# Patient Record
Sex: FEMALE | Employment: FULL TIME | URBAN - METROPOLITAN AREA
[De-identification: names, ages, dates, MRNs, and addresses within clinical notes are randomized per-mention and may not be internally consistent; named-entity substitution may affect disease eponyms.]

---

## 2020-12-19 ENCOUNTER — HOSPITAL ENCOUNTER (EMERGENCY)
Facility: HOSPITAL | Age: 60
Discharge: STILL ADMITTED OR ADMINISTRATIVE | End: 2020-12-19

## 2022-09-23 PROBLEM — I10 ESSENTIAL HYPERTENSION: Status: ACTIVE | Noted: 2022-09-23

## 2022-09-23 PROBLEM — M25.473 ANKLE EDEMA: Status: ACTIVE | Noted: 2022-09-23

## 2022-09-23 PROBLEM — R06.02 SHORTNESS OF BREATH: Status: ACTIVE | Noted: 2022-09-23

## 2022-09-23 PROBLEM — M54.9 CHRONIC BACK PAIN: Status: ACTIVE | Noted: 2022-09-23

## 2022-09-23 PROBLEM — N39.3 URINARY, INCONTINENCE, STRESS FEMALE: Status: ACTIVE | Noted: 2022-09-23

## 2022-09-23 PROBLEM — E66.813 CLASS 3 SEVERE OBESITY WITH BODY MASS INDEX (BMI) OF 50.0 TO 59.9 IN ADULT: Status: ACTIVE | Noted: 2022-09-23

## 2022-09-23 PROBLEM — M25.569 CHRONIC KNEE PAIN: Status: ACTIVE | Noted: 2022-09-23

## 2022-09-23 PROBLEM — R06.83 SNORING: Status: ACTIVE | Noted: 2022-09-23

## 2022-09-23 PROBLEM — R53.1 WEAKNESS: Status: ACTIVE | Noted: 2022-09-23

## 2022-09-23 PROBLEM — E66.01 CLASS 3 SEVERE OBESITY WITH BODY MASS INDEX (BMI) OF 50.0 TO 59.9 IN ADULT: Status: ACTIVE | Noted: 2022-09-23

## 2022-09-23 PROBLEM — G89.29 CHRONIC BACK PAIN: Status: ACTIVE | Noted: 2022-09-23

## 2022-09-23 PROBLEM — G89.29 CHRONIC KNEE PAIN: Status: ACTIVE | Noted: 2022-09-23

## 2022-09-23 PROBLEM — I73.9 INTERMITTENT CLAUDICATION: Status: ACTIVE | Noted: 2022-09-23

## 2022-09-26 ENCOUNTER — CONSULT (OUTPATIENT)
Dept: BARIATRICS/WEIGHT MGMT | Facility: CLINIC | Age: 62
End: 2022-09-26

## 2022-09-26 VITALS
SYSTOLIC BLOOD PRESSURE: 156 MMHG | HEART RATE: 84 BPM | HEIGHT: 63 IN | DIASTOLIC BLOOD PRESSURE: 91 MMHG | RESPIRATION RATE: 18 BRPM | TEMPERATURE: 97.8 F | WEIGHT: 293 LBS | BODY MASS INDEX: 51.91 KG/M2

## 2022-09-26 DIAGNOSIS — E66.01 CLASS 3 SEVERE OBESITY DUE TO EXCESS CALORIES WITH SERIOUS COMORBIDITY AND BODY MASS INDEX (BMI) OF 50.0 TO 59.9 IN ADULT: Primary | ICD-10-CM

## 2022-09-26 DIAGNOSIS — R06.83 SNORING: ICD-10-CM

## 2022-09-26 DIAGNOSIS — R06.02 SHORTNESS OF BREATH: ICD-10-CM

## 2022-09-26 DIAGNOSIS — N39.3 URINARY, INCONTINENCE, STRESS FEMALE: ICD-10-CM

## 2022-09-26 DIAGNOSIS — I10 ESSENTIAL HYPERTENSION: ICD-10-CM

## 2022-09-26 DIAGNOSIS — G89.29 CHRONIC BACK PAIN, UNSPECIFIED BACK LOCATION, UNSPECIFIED BACK PAIN LATERALITY: ICD-10-CM

## 2022-09-26 DIAGNOSIS — M54.9 CHRONIC BACK PAIN, UNSPECIFIED BACK LOCATION, UNSPECIFIED BACK PAIN LATERALITY: ICD-10-CM

## 2022-09-26 DIAGNOSIS — K21.9 GASTROESOPHAGEAL REFLUX DISEASE, UNSPECIFIED WHETHER ESOPHAGITIS PRESENT: ICD-10-CM

## 2022-09-26 DIAGNOSIS — G89.29 CHRONIC KNEE PAIN, UNSPECIFIED LATERALITY: ICD-10-CM

## 2022-09-26 DIAGNOSIS — M25.569 CHRONIC KNEE PAIN, UNSPECIFIED LATERALITY: ICD-10-CM

## 2022-09-26 DIAGNOSIS — M25.473 ANKLE EDEMA: ICD-10-CM

## 2022-09-26 DIAGNOSIS — R53.1 WEAKNESS: ICD-10-CM

## 2022-09-26 PROBLEM — Z01.818 PREOPERATIVE TESTING: Status: ACTIVE | Noted: 2022-09-26

## 2022-09-26 PROBLEM — Z01.818 PREOPERATIVE TESTING: Status: RESOLVED | Noted: 2022-09-26 | Resolved: 2022-09-26

## 2022-09-26 PROCEDURE — 99205 OFFICE O/P NEW HI 60 MIN: CPT | Performed by: NURSE PRACTITIONER

## 2022-09-26 RX ORDER — OMEPRAZOLE 40 MG/1
1 CAPSULE, DELAYED RELEASE ORAL DAILY
COMMUNITY
Start: 2022-09-12

## 2022-09-26 RX ORDER — ATENOLOL 50 MG/1
1 TABLET ORAL DAILY
COMMUNITY
Start: 2022-08-14

## 2022-09-26 RX ORDER — MELOXICAM 15 MG/1
1 TABLET ORAL DAILY
COMMUNITY
Start: 2022-08-24

## 2022-09-26 RX ORDER — ASPIRIN 81 MG/1
81 TABLET, CHEWABLE ORAL DAILY
COMMUNITY

## 2022-09-26 RX ORDER — SPIRONOLACTONE 50 MG/1
1 TABLET, FILM COATED ORAL DAILY
COMMUNITY
Start: 2022-08-23

## 2022-09-26 RX ORDER — DIPHENHYDRAMINE HCL 25 MG
25 CAPSULE ORAL 2 TIMES DAILY
COMMUNITY

## 2022-09-26 RX ORDER — SIMVASTATIN 40 MG
1 TABLET ORAL DAILY
COMMUNITY
Start: 2022-08-24

## 2022-09-26 RX ORDER — HYDROCHLOROTHIAZIDE 50 MG/1
1 TABLET ORAL DAILY
COMMUNITY
Start: 2022-08-23

## 2022-09-26 RX ORDER — POTASSIUM CHLORIDE 750 MG/1
10 TABLET, FILM COATED, EXTENDED RELEASE ORAL 2 TIMES DAILY
COMMUNITY

## 2022-09-26 NOTE — PROGRESS NOTES
"Metabolic and Bariatric Surgery Nutrition Assessment    Patient Name: Mishel Jimenez    YOB: 1960   Age: 61 y.o.  Sex: female  MRN: [id number]     Assessment Date: 9/26/22    Procedure considering: band     Anthropometric Measurements  Height: 62.99\"          Current weight: 311 lbs   BMI: 55.11 kg/m²    Patient Goals and Expectations                        Patient's stated weight goal: 190-200 lbs  Reasons for desiring weight loss: improved quality of life, less pain and increased energy     Medical History  Pertinent diagnosis/problems: hypertension, edema, back pain, knee pain and shortness of breath    Weight History  Highest adult weight: 311 lbs                              Lowest adult weight: 147 lbs  Onset of obesity: childhood   Possible triggers or life events that may have led to weight gain: always been overweight, loves food     Previous Weight Loss Efforts  Patient has tried to lose weight in the past including Weight Watchers, calorie counting, low carbohydrate, low fat diet.   Patient has lost up to 60 lbs on diets, but was unable to maintain this long term.     Diet History  Patient is eating 2-3 meals and 1-2 snacks daily. 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   Current Intake  Breakfast: breakfast sandwich, bakery item or nothing   Lunch: [employer] sandwich, fries and dessert or ham sandwich   Dinner: [employer] sandwich fries and dessert or meat, vegetable and starch  Snacks: cookies, ice cream   Beverages: sweet tea, iced coffee   Eating out: daily   Vitamins/supplements: none   Diet restrictions or food allergies: none     Patient identifies problem areas to be physical hunger, over consumption, eating large portions, eating out of boredom, emotional eating, enjoyment of food and inactivity.      Physical Activity  Work-related activity: sedentary, a lot of traveling   Planned exercise: none   Barriers to activity: knee pain     Nutrition Intervention  Pre " and post op nutrition education and coaching for behavior change completed. Program materials provided. Emphasized the importance of taking in at least 70 g protein and 64 oz fluid daily. Discussed personal habits and lifestyle behaviors that may influence weight loss efforts. Self monitoring strategies such as keeping a food journal were encouraged. Patient verbalized understanding and questions were answered.  Short term goals: practice mindful eating, decrease/eliminate high calorie beverages     Recommendations/Plan  Patient will be evaluated by multidisciplinary team before undergoing a review of their candidacy.  Additional nutrition counseling available and encouraged both pre and post op.   Patient demonstrated a good comprehension of diet requirements and commitment to make healthy changes.   Mishel Jimenez appears to be a suitable candidate for bariatric surgery from a nutritional standpoint.      Bonnie Waters RD  09/26/22  15:01 EDT

## 2022-09-26 NOTE — PROGRESS NOTES
[facility] BARIATRIC SURGERY  [address] [facility] [address]  [facility] [address]  [phone]  [address] [id number] [phone]  Dept: [phone]  9/26/2022      Mishel Jimenez.  [id number]  [id number]  1960  female      Chief Complaint of weight gain; unable to maintain weight loss    History of Present Illness:   Mishel is a 61 y.o. female who presents today for evaluation, education and consultation regarding weight loss surgery. The patient is interested in the lap band.      Diet History:Mishel has been overweight for at least 40 years, has been 35 pounds or more overweight for at least 35 years, has been 100 pounds or more overweight for 35 or more years and started dieting at age 32.  The most weight Mishel lost was 16 pounds on 30 and maintained the weight loss for 12 months. Mishel describes her eating habits as snacking without portion control, overeating at mealtimes, drinking coffee and soda. Mishel Jimenez has tried Atkins, [place], Weight Watchers, Physician monitored, Dietician monitored, reduced calorie, prescription medications and previous weight loss surgery among others with success of losing up to 30 pounds, but in each instance regained the weight.     See dietician documentation for complete history.    Bariatric Surgery Evaluation: The patient is being seen for an initial visit for bariatric surgery evaluation and education.     Bariatric Co-morbidities:  hypertension, back pain, knee pain and edema    Patient Active Problem List   Diagnosis   • Class 3 severe obesity with body mass index (BMI) of 50.0 to 59.9 in adult (HCC)   • Essential hypertension   • Ankle edema   • Intermittent claudication (HCC)   • Shortness of breath   • Snoring   • Urinary, incontinence, stress female   • Chronic back pain   • Chronic knee pain   • Weakness   • GERD (gastroesophageal reflux disease)       No past medical history on  file.    Past Surgical History:   Procedure Laterality Date   • HIATAL HERNIA REPAIR  2015   • LAPAROSCOPIC CHOLECYSTECTOMY  2008       Allergies   Allergen Reactions   • Demerol [Meperidine] Nausea And Vomiting         Current Outpatient Medications:   •  atenolol (TENORMIN) 50 MG tablet, Take 1 tablet by mouth Daily., Disp: , Rfl:   •  hydroCHLOROthiazide (HYDRODIURIL) 50 MG tablet, Take 1 tablet by mouth Daily., Disp: , Rfl:   •  meloxicam (MOBIC) 15 MG tablet, Take 1 tablet by mouth Daily., Disp: , Rfl:   •  omeprazole (priLOSEC) 40 MG capsule, Take 1 capsule by mouth Daily., Disp: , Rfl:   •  simvastatin (ZOCOR) 40 MG tablet, Take 1 tablet by mouth Daily., Disp: , Rfl:   •  spironolactone (ALDACTONE) 50 MG tablet, Take 1 tablet by mouth Daily., Disp: , Rfl:     Social History     Socioeconomic History   • Marital status:    Tobacco Use   • Smoking status: Former Smoker     Types: Cigarettes     Quit date: 2019     Years since quitting: 3.7   • Smokeless tobacco: Never Used   Vaping Use   • Vaping Use: Never used   Substance and Sexual Activity   • Alcohol use: Not Currently     Comment: 1 drink per month   • Drug use: Never   • Sexual activity: Defer       Family History   Problem Relation Age of Onset   • Hypertension Father    • Sleep apnea Father    • Obesity Sister    • Heart attack Maternal Grandmother    • Hypertension Paternal Grandmother    • Obesity Paternal Grandmother    • Parkinsonism Paternal Grandfather          Review of Systems:  Review of Systems   Constitutional: Positive for appetite change. Negative for fatigue and unexpected weight change.   HENT: Negative.    Eyes: Negative.    Respiratory: Negative.    Cardiovascular: Positive for leg swelling.   Gastrointestinal: Negative for abdominal distention, abdominal pain, constipation, diarrhea, nausea and vomiting.   Genitourinary: Negative for difficulty urinating, frequency and urgency.   Musculoskeletal: Positive for arthralgias and  back pain.   Skin: Negative.    Psychiatric/Behavioral: Negative.    All other systems reviewed and are negative.      Physical Exam:  Vital Signs:  Weight: (!) 141 kg (311 lb)   Body mass index is 55.11 kg/m².  Temp: 97.8 °F (36.6 °C)   Heart Rate: 84   BP: 156/91     Physical Exam  Vitals and nursing note reviewed.   Constitutional:       Appearance: She is well-developed. She is obese.   Neck:      Thyroid: No thyromegaly.   Cardiovascular:      Rate and Rhythm: Tachycardia present.   Pulmonary:      Effort: Pulmonary effort is normal. No respiratory distress.   Abdominal:      Palpations: Abdomen is soft.   Musculoskeletal:         General: No tenderness.   Skin:     General: Skin is warm and dry.   Neurological:      Mental Status: She is alert.   Psychiatric:         Behavior: Behavior normal.            Assessment:         Mishel Jimenez is a 61 y.o. year old female with medically complicated severe obesity. Weight: (!) 141 kg (311 lb), Body mass index is 55.11 kg/m². and weight related problems including hypertension, back pain, knee pain and edema.    I explained in detail, potential surgical options of interest to the patient including the RNY gastric bypass, sleeve gastrectomy, and gastric band while considering the patient's medical history. At this time, the patient expressed interest in the Laparoscopic Sleeve  All of those procedures can be performed laparoscopically but there is a chance to convert to open if any technical challenges or complications do occur.  Bariatric surgery is not cosmetic surgery but rather a tool to help a patient make a life-long commitment lifestyle changes including diet, exercise, behavior changes, and taking supplemental vitamins and minerals.    Due to the patient's BMI, history, and co-morbidities related to potential surgical complications were evaluated. Due to Mishel Jimenez's risk factors female will obtain the following prior to being scheduled for surgical  intervention:    A letter of medical support as well as a history and physical must be obtained from her primary care provider. The patient was given a copy of a sample form, that will suffice as their letter to take to their primary are provider.    A referral for pre-operative psychological evaluation was ordered for the patient to evaluate candidacy as well as provide mental health support, should it be warranted before or after surgery.    Patient will have PCP send copies of lab work and  CXR, EGD with biopsy and psychology clearance and UGI were ordered at this time. These will be drawn and patient will be notified with results. Patient will complete new, pre-operative radiology prior to being scheduled for surgery. Mishel Jimenez will obtain clearance from a cardiologist prior to surgery.     Mishel Jimenez was screened for sleep apnea in our office today and based on their results she is low risk for ENMANUEL. The risks, as they relate to chronic hypercapnia r/t untreated ENMANUEL were discussed with the patient and She verbalized understanding.       The risks, benefits, alternatives, and potential complications of all of the sleeve gastrectomy explained in detail including, but not limited to death, anesthesia and medication adverse effect/DVT, pulmonary embolism, trocar site/incisional hernia, wound infection, abdominal infection, bleeding, failure to lose weight or gain weight and change in body image, metabolic complications with calcium, thiamine, vitamin B12, folate, iron, and anemia.    As this patient is a female who is post-menopausal or has undergone a surgical operation which precluded her from becoming pregnant she was not counseled related to conception and pregnancy.     The patient was advised to start a high protein, low fat and low carbohydrate diet  start routine exercise including but not limited to 150 minutes per week. The patient received a resistance band along with a handout of exercises. The  patient was given individualized information by our dietician along with handouts.     The patient was given information regarding the MELVIN educational video. MELVIN is an internet based educational video which explains the sourgical procedure and answers basic questions regarding the procedure. The patient was provided with instructions and a password to watch the video.    The patient understands the surgical procedures and the different surgical options that are available.  She understands the lifestyle changes that would be required after surgery and has agreed to participate in a pre-operative and postoperative weight management program.  She also expressed understanding of possible risks, had several questions answered and desires to proceed.    I think she is a good candidate for this surgery, and is interested in a sleeve gastrectomy.    Encounter Diagnoses   Name Primary?   • Class 3 severe obesity due to excess calories with serious comorbidity and body mass index (BMI) of 50.0 to 59.9 in adult (HCC) Yes   • Essential hypertension    • Chronic knee pain, unspecified laterality    • Ankle edema    • Shortness of breath    • Weakness    • Urinary, incontinence, stress female    • Chronic back pain, unspecified back location, unspecified back pain laterality    • Gastroesophageal reflux disease, unspecified whether esophagitis present        Plan:    Patient will be evaluated by a bariatric dietician psychologist before undergoing a multidisciplinary review of her candidacy. We discussed weight loss requirements prior to surgery and rationale, as well as other program requirements to ensure the safest approach to surgery. We spent time discussing different surgical procedures and plan of care throughout their lifespan to ensure long term success in achieving and maintaining a healthier weight. Patient will proceed with preoperative lab work, radiology, and endoscopy after obtaining agreed upon specialty,  clearances, sleep study, and letter of support from her primary care provider.    Total time spent during this encounter today was 65 minutes and includes preparing for the visit, reviewing tests, performing a medically appropriate examination and/or evaluations, counseling and educating the patient/family/caregiver, ordering medications, tests, or procedures, documenting information in the medical record and independently interpreting results and communicating that information with the patient/family/caregiver  TIFFANY Mesa  9/26/2022

## 2022-11-14 ENCOUNTER — HOSPITAL ENCOUNTER (OUTPATIENT)
Dept: CARDIOLOGY | Facility: HOSPITAL | Age: 62
Discharge: HOME | End: 2022-11-14

## 2022-11-14 ENCOUNTER — HOSPITAL ENCOUNTER (OUTPATIENT)
Dept: GENERAL RADIOLOGY | Facility: HOSPITAL | Age: 62
Discharge: HOME | End: 2022-11-14

## 2022-11-14 DIAGNOSIS — M25.569 CHRONIC KNEE PAIN, UNSPECIFIED LATERALITY: ICD-10-CM

## 2022-11-14 DIAGNOSIS — E66.01 CLASS 3 SEVERE OBESITY DUE TO EXCESS CALORIES WITH SERIOUS COMORBIDITY AND BODY MASS INDEX (BMI) OF 50.0 TO 59.9 IN ADULT: ICD-10-CM

## 2022-11-14 DIAGNOSIS — I10 ESSENTIAL HYPERTENSION: ICD-10-CM

## 2022-11-14 DIAGNOSIS — M25.473 ANKLE EDEMA: ICD-10-CM

## 2022-11-14 DIAGNOSIS — N39.3 URINARY, INCONTINENCE, STRESS FEMALE: ICD-10-CM

## 2022-11-14 DIAGNOSIS — G89.29 CHRONIC KNEE PAIN, UNSPECIFIED LATERALITY: ICD-10-CM

## 2022-11-14 DIAGNOSIS — G89.29 CHRONIC BACK PAIN, UNSPECIFIED BACK LOCATION, UNSPECIFIED BACK PAIN LATERALITY: ICD-10-CM

## 2022-11-14 DIAGNOSIS — R53.1 WEAKNESS: ICD-10-CM

## 2022-11-14 DIAGNOSIS — R06.83 SNORING: ICD-10-CM

## 2022-11-14 DIAGNOSIS — R06.02 SHORTNESS OF BREATH: ICD-10-CM

## 2022-11-14 DIAGNOSIS — K21.9 GASTROESOPHAGEAL REFLUX DISEASE, UNSPECIFIED WHETHER ESOPHAGITIS PRESENT: ICD-10-CM

## 2022-11-14 DIAGNOSIS — M54.9 CHRONIC BACK PAIN, UNSPECIFIED BACK LOCATION, UNSPECIFIED BACK PAIN LATERALITY: ICD-10-CM

## 2022-11-14 LAB — QT INTERVAL: 432 MS

## 2022-11-14 PROCEDURE — 74246 X-RAY XM UPR GI TRC 2CNTRST: CPT

## 2022-11-14 PROCEDURE — 93010 ELECTROCARDIOGRAM REPORT: CPT | Performed by: INTERNAL MEDICINE

## 2022-11-14 PROCEDURE — 71046 X-RAY EXAM CHEST 2 VIEWS: CPT

## 2022-11-14 PROCEDURE — 93005 ELECTROCARDIOGRAM TRACING: CPT | Performed by: NURSE PRACTITIONER

## 2022-11-14 RX ADMIN — ANTACID/ANTIFLATULENT 1 PACKET: 380; 550; 10; 10 GRANULE, EFFERVESCENT ORAL at 11:14

## 2022-11-14 RX ADMIN — BARIUM SULFATE 135 ML: 980 POWDER, FOR SUSPENSION ORAL at 11:14

## 2022-11-14 RX ADMIN — BARIUM SULFATE 355 ML: 0.6 SUSPENSION ORAL at 11:14

## 2022-11-21 DIAGNOSIS — I49.1 ECTOPIC ATRIAL RHYTHM: Primary | ICD-10-CM

## 2022-12-27 ENCOUNTER — OFFICE VISIT (OUTPATIENT)
Dept: CARDIOLOGY | Facility: CLINIC | Age: 62
End: 2022-12-27

## 2022-12-27 VITALS
HEIGHT: 63 IN | WEIGHT: 293 LBS | HEART RATE: 69 BPM | DIASTOLIC BLOOD PRESSURE: 68 MMHG | BODY MASS INDEX: 51.91 KG/M2 | SYSTOLIC BLOOD PRESSURE: 132 MMHG

## 2022-12-27 DIAGNOSIS — E78.5 HYPERLIPIDEMIA, UNSPECIFIED HYPERLIPIDEMIA TYPE: ICD-10-CM

## 2022-12-27 DIAGNOSIS — I49.1 PAC (PREMATURE ATRIAL CONTRACTION): ICD-10-CM

## 2022-12-27 DIAGNOSIS — I10 ESSENTIAL HYPERTENSION: Primary | ICD-10-CM

## 2022-12-27 PROCEDURE — 99204 OFFICE O/P NEW MOD 45 MIN: CPT | Performed by: INTERNAL MEDICINE

## 2022-12-27 PROCEDURE — 93000 ELECTROCARDIOGRAM COMPLETE: CPT | Performed by: INTERNAL MEDICINE

## 2022-12-27 NOTE — PROGRESS NOTES
Subjective:     Encounter Date:12/27/2022      Patient ID: Mishel Jimenez is a 62 y.o. female.    Chief Complaint:  History of Present Illness    This is a 62-year-old with hypertension, hyperlipidemia, morbid obesity, who presents for preoperative evaluation prior to bariatric surgery and for evaluation of PACs.    Patient's was undergoing routine evaluation prior to bariatric surgery.  An EKG was performed in 11/2022 which showed sinus rhythm with frequent premature atrial contractions.  As a result she was sent here for further cardiac evaluation.    The patient denies any chest pain, palpitations, orthopnea, near-syncope or syncope, or lower extremity edema.  She reports stable dyspnea with exertion.  She does not exercise but does a lot of walking as part of her job and denies any significant issues with this.  She does not routinely climb stairs but indicates she is able to climb a flight of stairs without stopping.  She reports that they thing that limits her the most are her knees.    She denies any personal cardiac history.  She denies any prior cardiac testing.  She denies any family history of heart disease in her immediate family.  She does have a grandfather who had a myocardial infarction in his late 70s.    Review of Systems   Constitutional: Positive for malaise/fatigue.   HENT: Negative for hearing loss, hoarse voice, nosebleeds and sore throat.    Eyes: Negative for pain.   Cardiovascular: Positive for leg swelling. Negative for chest pain, claudication, cyanosis, dyspnea on exertion, irregular heartbeat, near-syncope, orthopnea, palpitations, paroxysmal nocturnal dyspnea and syncope.   Respiratory: Negative for shortness of breath and snoring.    Endocrine: Negative for cold intolerance, heat intolerance, polydipsia, polyphagia and polyuria.   Skin: Negative for itching and rash.   Musculoskeletal: Positive for joint pain. Negative for arthritis, falls, joint swelling, muscle cramps, muscle  weakness and myalgias.   Gastrointestinal: Negative for constipation, diarrhea, dysphagia, heartburn, hematemesis, hematochezia, melena, nausea and vomiting.   Genitourinary: Negative for frequency, hematuria and hesitancy.   Neurological: Negative for excessive daytime sleepiness, dizziness, headaches, light-headedness, numbness and weakness.   Psychiatric/Behavioral: Negative for depression. The patient is not nervous/anxious.          Current Outpatient Medications:   •  aspirin 81 MG chewable tablet, Chew 81 mg Daily., Disp: , Rfl:   •  atenolol (TENORMIN) 50 MG tablet, Take 1 tablet by mouth Daily., Disp: , Rfl:   •  diphenhydrAMINE (BENADRYL) 25 mg capsule, Take 25 mg by mouth 2 (Two) Times a Day., Disp: , Rfl:   •  hydroCHLOROthiazide (HYDRODIURIL) 50 MG tablet, Take 1 tablet by mouth Daily., Disp: , Rfl:   •  meloxicam (MOBIC) 15 MG tablet, Take 1 tablet by mouth Daily., Disp: , Rfl:   •  omeprazole (priLOSEC) 40 MG capsule, Take 1 capsule by mouth Daily., Disp: , Rfl:   •  potassium chloride 10 MEQ CR tablet, Take 10 mEq by mouth 2 (Two) Times a Day., Disp: , Rfl:   •  simvastatin (ZOCOR) 40 MG tablet, Take 1 tablet by mouth Daily., Disp: , Rfl:   •  spironolactone (ALDACTONE) 50 MG tablet, Take 1 tablet by mouth Daily., Disp: , Rfl:     Past Medical History:   Diagnosis Date   • Hyperlipidemia    • Hypertension        Past Surgical History:   Procedure Laterality Date   •  SECTION     • HERNIA REPAIR Right 2014    ventral   • HIATAL HERNIA REPAIR  2015   • LAPAROSCOPIC CHOLECYSTECTOMY  2008       Family History   Problem Relation Age of Onset   • Hypertension Father    • Sleep apnea Father    • Obesity Sister    • Heart attack Maternal Grandmother    • Hypertension Paternal Grandmother    • Obesity Paternal Grandmother    • Parkinsonism Paternal Grandfather    • Heart attack Maternal Grandfather        Social History     Tobacco Use   • Smoking status: Former     Packs/day: 1.00      "Years: 30.00     Pack years: 30.00     Types: Cigarettes     Start date: 1980     Quit date: 2020     Years since quittin.8   • Smokeless tobacco: Never   • Tobacco comments:     Stopped when pregnant for approx 5 years and then again for an additinal 5.5 years   Vaping Use   • Vaping Use: Never used   Substance Use Topics   • Alcohol use: Yes     Comment: Occasionally. 2-3 drinks 2-3 times a year   • Drug use: Never         ECG 12 Lead    Date/Time: 2022 2:56 PM  Performed by: Elvira Pedersen MD  Authorized by: Elvira Pedersen MD   Comparison: compared with previous ECG   Similar to previous ECG  Rhythm: sinus rhythm  Ectopy: atrial premature contractions               Objective:     Visit Vitals  /68   Pulse 69   Ht 160 cm (63\")   Wt (!) 141 kg (311 lb)   BMI 55.09 kg/m²         Constitutional:       Appearance: Normal appearance. Well-developed.   Eyes:      General: Lids are normal.      Conjunctiva/sclera: Conjunctivae normal.      Pupils: Pupils are equal, round, and reactive to light.   HENT:      Head: Normocephalic and atraumatic.   Neck:      Vascular: No carotid bruit or JVD.      Lymphadenopathy: No cervical adenopathy.   Pulmonary:      Effort: Pulmonary effort is normal.      Breath sounds: Normal breath sounds.   Cardiovascular:      Normal rate. Regular rhythm.      No gallop.   Pulses:     Radial: 2+ bilaterally.  Edema:     Peripheral edema absent.   Abdominal:      Palpations: Abdomen is soft.   Musculoskeletal:      Cervical back: Full passive range of motion without pain, normal range of motion and neck supple. Skin:     General: Skin is warm and dry.   Neurological:      Mental Status: Alert and oriented to person, place, and time.             Assessment:          Diagnosis Plan   1. Essential hypertension        2. PAC (premature atrial contraction)  Adult Transthoracic Echo Complete w/ Color, Spectral and Contrast if Necessary Per Protocol      3. Hyperlipidemia, " unspecified hyperlipidemia type               Plan:         1.  PACs.  Asymptomatic.  Reassured the patient that these are benign but anything could be reasonable to proceed with an echocardiogram to rule out structural heart disease.  2.  Hypertension.  Well-controlled on current regimen medications.  Continue the same.  3.  Hyperlipidemia.  On simvastatin.  4.  Morbid obesity.  Undergoing evaluation for bariatric surgery.    We will follow-up on echocardiogram results.  If this looks okay I think the patient can proceed with surgery as planned.  Further recommendations will be based on the results of her echocardiogram.

## 2022-12-30 ENCOUNTER — PATIENT ROUNDING (BHMG ONLY) (OUTPATIENT)
Dept: CARDIOLOGY | Facility: CLINIC | Age: 62
End: 2022-12-30

## 2022-12-30 NOTE — PROGRESS NOTES
A My Chart message has been sent to the patient for [place] [facility] [place]

## 2023-01-19 ENCOUNTER — HOSPITAL ENCOUNTER (OUTPATIENT)
Dept: CARDIOLOGY | Facility: HOSPITAL | Age: 63
Discharge: HOME | End: 2023-01-19
Admitting: INTERNAL MEDICINE
Payer: COMMERCIAL

## 2023-01-19 VITALS
HEART RATE: 74 BPM | DIASTOLIC BLOOD PRESSURE: 82 MMHG | WEIGHT: 293 LBS | SYSTOLIC BLOOD PRESSURE: 149 MMHG | HEIGHT: 63 IN | BODY MASS INDEX: 51.91 KG/M2

## 2023-01-19 DIAGNOSIS — I49.1 PAC (PREMATURE ATRIAL CONTRACTION): ICD-10-CM

## 2023-01-19 LAB
ASCENDING AORTA: 2.3 CM
BH CV ECHO MEAS - ACS: 2.33 CM
BH CV ECHO MEAS - AO MAX PG: 12.6 MMHG
BH CV ECHO MEAS - AO MEAN PG: 7.3 MMHG
BH CV ECHO MEAS - AO ROOT DIAM: 3.4 CM
BH CV ECHO MEAS - AO V2 MAX: 177.4 CM/SEC
BH CV ECHO MEAS - AO V2 VTI: 39.2 CM
BH CV ECHO MEAS - AVA(I,D): 1.28 CM2
BH CV ECHO MEAS - EDV(CUBED): 177.8 ML
BH CV ECHO MEAS - EDV(MOD-SP2): 147 ML
BH CV ECHO MEAS - EDV(MOD-SP4): 147 ML
BH CV ECHO MEAS - EF(MOD-BP): 62.6 %
BH CV ECHO MEAS - EF(MOD-SP2): 62.6 %
BH CV ECHO MEAS - EF(MOD-SP4): 61.2 %
BH CV ECHO MEAS - ESV(CUBED): 93 ML
BH CV ECHO MEAS - ESV(MOD-SP2): 55 ML
BH CV ECHO MEAS - ESV(MOD-SP4): 57 ML
BH CV ECHO MEAS - FS: 19.4 %
BH CV ECHO MEAS - IVS/LVPW: 1.12 CM
BH CV ECHO MEAS - IVSD: 0.79 CM
BH CV ECHO MEAS - LAT PEAK E' VEL: 10.7 CM/SEC
BH CV ECHO MEAS - LV DIASTOLIC VOL/BSA (35-75): 63 CM2
BH CV ECHO MEAS - LV MASS(C)D: 153.6 GRAMS
BH CV ECHO MEAS - LV MAX PG: 2.5 MMHG
BH CV ECHO MEAS - LV MEAN PG: 1.44 MMHG
BH CV ECHO MEAS - LV SYSTOLIC VOL/BSA (12-30): 24.4 CM2
BH CV ECHO MEAS - LV V1 MAX: 79.6 CM/SEC
BH CV ECHO MEAS - LV V1 VTI: 14.4 CM
BH CV ECHO MEAS - LVIDD: 5.6 CM
BH CV ECHO MEAS - LVIDS: 4.5 CM
BH CV ECHO MEAS - LVOT AREA: 3.5 CM2
BH CV ECHO MEAS - LVOT DIAM: 2.1 CM
BH CV ECHO MEAS - LVPWD: 0.71 CM
BH CV ECHO MEAS - MED PEAK E' VEL: 7.8 CM/SEC
BH CV ECHO MEAS - MV A DUR: 0.12 SEC
BH CV ECHO MEAS - MV A MAX VEL: 95.5 CM/SEC
BH CV ECHO MEAS - MV DEC SLOPE: 511.2 CM/SEC2
BH CV ECHO MEAS - MV DEC TIME: 0.18 MSEC
BH CV ECHO MEAS - MV E MAX VEL: 95.2 CM/SEC
BH CV ECHO MEAS - MV E/A: 1
BH CV ECHO MEAS - MV MAX PG: 4.9 MMHG
BH CV ECHO MEAS - MV MEAN PG: 2.7 MMHG
BH CV ECHO MEAS - MV V2 VTI: 29.4 CM
BH CV ECHO MEAS - MVA(VTI): 1.71 CM2
BH CV ECHO MEAS - PA ACC TIME: 0.13 SEC
BH CV ECHO MEAS - PA PR(ACCEL): 20.4 MMHG
BH CV ECHO MEAS - PA V2 MAX: 93.4 CM/SEC
BH CV ECHO MEAS - PULM A REVS DUR: 0.17 SEC
BH CV ECHO MEAS - PULM A REVS VEL: 33 CM/SEC
BH CV ECHO MEAS - PULM DIAS VEL: 34.7 CM/SEC
BH CV ECHO MEAS - PULM S/D: 1.63
BH CV ECHO MEAS - PULM SYS VEL: 56.6 CM/SEC
BH CV ECHO MEAS - QP/QS: 0.43
BH CV ECHO MEAS - RV MAX PG: 1.18 MMHG
BH CV ECHO MEAS - RV V1 MAX: 54.4 CM/SEC
BH CV ECHO MEAS - RV V1 VTI: 9.5 CM
BH CV ECHO MEAS - RVOT DIAM: 1.69 CM
BH CV ECHO MEAS - SI(MOD-SP2): 39.4 ML/M2
BH CV ECHO MEAS - SI(MOD-SP4): 38.6 ML/M2
BH CV ECHO MEAS - SV(LVOT): 50.2 ML
BH CV ECHO MEAS - SV(MOD-SP2): 92 ML
BH CV ECHO MEAS - SV(MOD-SP4): 90 ML
BH CV ECHO MEAS - SV(RVOT): 21.3 ML
BH CV ECHO MEAS - TAPSE (>1.6): 2.25 CM
BH CV ECHO MEASUREMENTS AVERAGE E/E' RATIO: 10.29
BH CV XLRA - RV BASE: 3.5 CM
BH CV XLRA - RV LENGTH: 6.4 CM
BH CV XLRA - RV MID: 2.48 CM
BH CV XLRA - TDI S': 10.2 CM/SEC
LEFT ATRIUM VOLUME INDEX: 24.8 ML/M2
MAXIMAL PREDICTED HEART RATE: 160 BPM
SINUS: 2.9 CM
STJ: 2.04 CM
STRESS TARGET HR: 136 BPM

## 2023-01-19 PROCEDURE — 93306 TTE W/DOPPLER COMPLETE: CPT | Performed by: INTERNAL MEDICINE

## 2023-01-19 PROCEDURE — 93306 TTE W/DOPPLER COMPLETE: CPT

## 2023-01-19 PROCEDURE — 25010000002 PERFLUTREN (DEFINITY) 8.476 MG IN SODIUM CHLORIDE (PF) 0.9 % 10 ML INJECTION: Performed by: INTERNAL MEDICINE

## 2023-01-19 RX ADMIN — PERFLUTREN 2 ML: 6.52 INJECTION, SUSPENSION INTRAVENOUS at 13:20

## 2023-01-20 NOTE — PROGRESS NOTES
Called and discussed normal echocardiogram results.  Okay to proceed with surgery as planned.  We will get a letter sent to Dr. Falcon's office.

## 2023-04-28 ENCOUNTER — TELEPHONE (OUTPATIENT)
Dept: BARIATRICS/WEIGHT MGMT | Facility: CLINIC | Age: 63
End: 2023-04-28
Payer: COMMERCIAL

## 2023-04-28 DIAGNOSIS — I10 ESSENTIAL HYPERTENSION: ICD-10-CM

## 2023-04-28 DIAGNOSIS — K21.9 GASTROESOPHAGEAL REFLUX DISEASE, UNSPECIFIED WHETHER ESOPHAGITIS PRESENT: ICD-10-CM

## 2023-04-28 DIAGNOSIS — I49.1 ECTOPIC ATRIAL RHYTHM: ICD-10-CM

## 2023-04-28 DIAGNOSIS — R06.83 SNORING: ICD-10-CM

## 2023-04-28 DIAGNOSIS — E66.01 CLASS 3 SEVERE OBESITY DUE TO EXCESS CALORIES WITH SERIOUS COMORBIDITY AND BODY MASS INDEX (BMI) OF 50.0 TO 59.9 IN ADULT: Primary | ICD-10-CM

## 2023-04-28 DIAGNOSIS — R06.02 SHORTNESS OF BREATH: ICD-10-CM

## 2023-04-28 DIAGNOSIS — R53.1 WEAKNESS: ICD-10-CM

## 2023-04-28 NOTE — TELEPHONE ENCOUNTER
Calling patient to go over remaining requirements for ls before able to submit  Advise patient that PCP could not complete the labs due to insurance coverage advs patient cn head to any [facility] to complete ; patient will head to [facility] to complete

## 2023-05-01 ENCOUNTER — LAB (OUTPATIENT)
Dept: LAB | Facility: HOSPITAL | Age: 63
End: 2023-05-01
Payer: COMMERCIAL

## 2023-05-01 DIAGNOSIS — I10 ESSENTIAL HYPERTENSION: ICD-10-CM

## 2023-05-01 DIAGNOSIS — E66.01 CLASS 3 SEVERE OBESITY DUE TO EXCESS CALORIES WITH SERIOUS COMORBIDITY AND BODY MASS INDEX (BMI) OF 50.0 TO 59.9 IN ADULT: ICD-10-CM

## 2023-05-01 DIAGNOSIS — R06.02 SHORTNESS OF BREATH: ICD-10-CM

## 2023-05-01 DIAGNOSIS — I49.1 ECTOPIC ATRIAL RHYTHM: ICD-10-CM

## 2023-05-01 DIAGNOSIS — R06.83 SNORING: ICD-10-CM

## 2023-05-01 DIAGNOSIS — K21.9 GASTROESOPHAGEAL REFLUX DISEASE, UNSPECIFIED WHETHER ESOPHAGITIS PRESENT: ICD-10-CM

## 2023-05-01 DIAGNOSIS — R53.1 WEAKNESS: ICD-10-CM

## 2023-05-01 LAB
BASOPHILS # BLD AUTO: 0.08 10*3/MM3 (ref 0–0.2)
BASOPHILS NFR BLD AUTO: 0.9 % (ref 0–1.5)
DEPRECATED RDW RBC AUTO: 42.1 FL (ref 37–54)
EOSINOPHIL # BLD AUTO: 0.13 10*3/MM3 (ref 0–0.4)
EOSINOPHIL NFR BLD AUTO: 1.5 % (ref 0.3–6.2)
ERYTHROCYTE [DISTWIDTH] IN BLOOD BY AUTOMATED COUNT: 13.2 % (ref 12.3–15.4)
HBA1C MFR BLD: 7.7 % (ref 4.8–5.6)
HCT VFR BLD AUTO: 39 % (ref 34–46.6)
HGB BLD-MCNC: 12.7 G/DL (ref 12–15.9)
IMM GRANULOCYTES # BLD AUTO: 0.03 10*3/MM3 (ref 0–0.05)
IMM GRANULOCYTES NFR BLD AUTO: 0.3 % (ref 0–0.5)
LYMPHOCYTES # BLD AUTO: 2.25 10*3/MM3 (ref 0.7–3.1)
LYMPHOCYTES NFR BLD AUTO: 25.8 % (ref 19.6–45.3)
MCH RBC QN AUTO: 28.5 PG (ref 26.6–33)
MCHC RBC AUTO-ENTMCNC: 32.6 G/DL (ref 31.5–35.7)
MCV RBC AUTO: 87.6 FL (ref 79–97)
MONOCYTES # BLD AUTO: 0.72 10*3/MM3 (ref 0.1–0.9)
MONOCYTES NFR BLD AUTO: 8.3 % (ref 5–12)
NEUTROPHILS NFR BLD AUTO: 5.5 10*3/MM3 (ref 1.7–7)
NEUTROPHILS NFR BLD AUTO: 63.2 % (ref 42.7–76)
NRBC BLD AUTO-RTO: 0 /100 WBC (ref 0–0.2)
PLATELET # BLD AUTO: 319 10*3/MM3 (ref 140–450)
PMV BLD AUTO: 10.5 FL (ref 6–12)
RBC # BLD AUTO: 4.45 10*6/MM3 (ref 3.77–5.28)
TSH SERPL DL<=0.05 MIU/L-ACNC: 1.79 UIU/ML (ref 0.27–4.2)
WBC NRBC COR # BLD: 8.71 10*3/MM3 (ref 3.4–10.8)

## 2023-05-01 PROCEDURE — 85025 COMPLETE CBC W/AUTO DIFF WBC: CPT

## 2023-05-01 PROCEDURE — 84443 ASSAY THYROID STIM HORMONE: CPT

## 2023-05-01 PROCEDURE — 36415 COLL VENOUS BLD VENIPUNCTURE: CPT

## 2023-05-01 PROCEDURE — 83036 HEMOGLOBIN GLYCOSYLATED A1C: CPT

## 2023-08-03 ENCOUNTER — TELEPHONE (OUTPATIENT)
Dept: BARIATRICS/WEIGHT MGMT | Facility: CLINIC | Age: 63
End: 2023-08-03
Payer: COMMERCIAL

## 2023-10-13 ENCOUNTER — PREP FOR SURGERY (OUTPATIENT)
Dept: OTHER | Facility: HOSPITAL | Age: 63
End: 2023-10-13
Payer: COMMERCIAL

## 2023-10-13 DIAGNOSIS — E66.01 CLASS 3 SEVERE OBESITY DUE TO EXCESS CALORIES WITH SERIOUS COMORBIDITY AND BODY MASS INDEX (BMI) OF 50.0 TO 59.9 IN ADULT: Primary | ICD-10-CM

## 2023-10-13 RX ORDER — METOCLOPRAMIDE HYDROCHLORIDE 5 MG/ML
10 INJECTION INTRAMUSCULAR; INTRAVENOUS ONCE
OUTPATIENT
Start: 2023-10-13 | End: 2023-10-13

## 2023-10-13 RX ORDER — CEFAZOLIN SODIUM IN 0.9 % NACL 3 G/100 ML
3 INTRAVENOUS SOLUTION, PIGGYBACK (ML) INTRAVENOUS ONCE
OUTPATIENT
Start: 2023-10-13 | End: 2023-10-13

## 2023-10-13 RX ORDER — PANTOPRAZOLE SODIUM 40 MG/10ML
40 INJECTION, POWDER, LYOPHILIZED, FOR SOLUTION INTRAVENOUS ONCE
OUTPATIENT
Start: 2023-10-13 | End: 2023-10-13

## 2023-10-13 RX ORDER — SODIUM CHLORIDE 9 MG/ML
40 INJECTION, SOLUTION INTRAVENOUS AS NEEDED
OUTPATIENT
Start: 2023-10-13

## 2023-10-13 RX ORDER — SCOLOPAMINE TRANSDERMAL SYSTEM 1 MG/1
1 PATCH, EXTENDED RELEASE TRANSDERMAL CONTINUOUS
OUTPATIENT
Start: 2023-10-13 | End: 2023-10-16

## 2023-10-13 RX ORDER — SODIUM CHLORIDE, SODIUM LACTATE, POTASSIUM CHLORIDE, CALCIUM CHLORIDE 600; 310; 30; 20 MG/100ML; MG/100ML; MG/100ML; MG/100ML
100 INJECTION, SOLUTION INTRAVENOUS CONTINUOUS
OUTPATIENT
Start: 2023-10-13

## 2023-10-13 RX ORDER — ENOXAPARIN SODIUM 100 MG/ML
40 INJECTION SUBCUTANEOUS
OUTPATIENT
Start: 2023-10-13 | End: 2023-10-14

## 2023-10-13 RX ORDER — GABAPENTIN 300 MG/1
300 CAPSULE ORAL ONCE
OUTPATIENT
Start: 2023-10-13 | End: 2023-10-13

## 2023-10-13 RX ORDER — SODIUM CHLORIDE 0.9 % (FLUSH) 0.9 %
10 SYRINGE (ML) INJECTION EVERY 12 HOURS SCHEDULED
OUTPATIENT
Start: 2023-10-13

## 2023-10-13 RX ORDER — CHLORHEXIDINE GLUCONATE ORAL RINSE 1.2 MG/ML
15 SOLUTION DENTAL SEE ADMIN INSTRUCTIONS
OUTPATIENT
Start: 2023-10-13

## 2023-10-13 RX ORDER — SODIUM CHLORIDE 0.9 % (FLUSH) 0.9 %
1-10 SYRINGE (ML) INJECTION AS NEEDED
OUTPATIENT
Start: 2023-10-13

## 2023-10-19 ENCOUNTER — PRE-ADMISSION TESTING (OUTPATIENT)
Dept: PREADMISSION TESTING | Facility: HOSPITAL | Age: 63
End: 2023-10-19
Payer: COMMERCIAL

## 2023-10-19 ENCOUNTER — CONSULT (OUTPATIENT)
Dept: BARIATRICS/WEIGHT MGMT | Facility: CLINIC | Age: 63
End: 2023-10-19
Payer: COMMERCIAL

## 2023-10-19 VITALS
HEIGHT: 64 IN | TEMPERATURE: 96.8 F | HEART RATE: 83 BPM | BODY MASS INDEX: 53.36 KG/M2 | DIASTOLIC BLOOD PRESSURE: 76 MMHG | RESPIRATION RATE: 18 BRPM | OXYGEN SATURATION: 97 % | SYSTOLIC BLOOD PRESSURE: 139 MMHG

## 2023-10-19 VITALS
BODY MASS INDEX: 51.91 KG/M2 | HEART RATE: 80 BPM | SYSTOLIC BLOOD PRESSURE: 149 MMHG | HEIGHT: 63 IN | TEMPERATURE: 98.1 F | DIASTOLIC BLOOD PRESSURE: 74 MMHG | WEIGHT: 293 LBS

## 2023-10-19 DIAGNOSIS — M25.569 CHRONIC KNEE PAIN, UNSPECIFIED LATERALITY: ICD-10-CM

## 2023-10-19 DIAGNOSIS — E66.01 OBESITY, CLASS III, BMI 40-49.9 (MORBID OBESITY): Primary | ICD-10-CM

## 2023-10-19 DIAGNOSIS — G89.29 CHRONIC KNEE PAIN, UNSPECIFIED LATERALITY: ICD-10-CM

## 2023-10-19 DIAGNOSIS — M54.9 OTHER CHRONIC BACK PAIN: ICD-10-CM

## 2023-10-19 DIAGNOSIS — Z71.3 DIETARY COUNSELING: ICD-10-CM

## 2023-10-19 DIAGNOSIS — E66.01 CLASS 3 SEVERE OBESITY DUE TO EXCESS CALORIES WITH SERIOUS COMORBIDITY AND BODY MASS INDEX (BMI) OF 50.0 TO 59.9 IN ADULT: Primary | ICD-10-CM

## 2023-10-19 DIAGNOSIS — I10 ESSENTIAL HYPERTENSION: ICD-10-CM

## 2023-10-19 DIAGNOSIS — G89.29 OTHER CHRONIC BACK PAIN: ICD-10-CM

## 2023-10-19 LAB
ALBUMIN SERPL-MCNC: 4.4 G/DL (ref 3.5–5.2)
ALBUMIN/GLOB SERPL: 1.8 G/DL
ALP SERPL-CCNC: 87 U/L (ref 39–117)
ALT SERPL W P-5'-P-CCNC: 20 U/L (ref 1–33)
ANION GAP SERPL CALCULATED.3IONS-SCNC: 13.3 MMOL/L (ref 5–15)
AST SERPL-CCNC: 19 U/L (ref 1–32)
BILIRUB SERPL-MCNC: 0.3 MG/DL (ref 0–1.2)
BUN SERPL-MCNC: 11 MG/DL (ref 8–23)
BUN/CREAT SERPL: 13.3 (ref 7–25)
CALCIUM SPEC-SCNC: 9.4 MG/DL (ref 8.6–10.5)
CHLORIDE SERPL-SCNC: 96 MMOL/L (ref 98–107)
CO2 SERPL-SCNC: 25.7 MMOL/L (ref 22–29)
CREAT SERPL-MCNC: 0.83 MG/DL (ref 0.57–1)
DEPRECATED RDW RBC AUTO: 42.5 FL (ref 37–54)
EGFRCR SERPLBLD CKD-EPI 2021: 80.8 ML/MIN/1.73
ERYTHROCYTE [DISTWIDTH] IN BLOOD BY AUTOMATED COUNT: 13.5 % (ref 12.3–15.4)
GLOBULIN UR ELPH-MCNC: 2.5 GM/DL
GLUCOSE SERPL-MCNC: 162 MG/DL (ref 65–99)
HCT VFR BLD AUTO: 35.6 % (ref 34–46.6)
HGB BLD-MCNC: 12.2 G/DL (ref 12–15.9)
MCH RBC QN AUTO: 29.8 PG (ref 26.6–33)
MCHC RBC AUTO-ENTMCNC: 34.3 G/DL (ref 31.5–35.7)
MCV RBC AUTO: 86.8 FL (ref 79–97)
PLATELET # BLD AUTO: 297 10*3/MM3 (ref 140–450)
PMV BLD AUTO: 9.7 FL (ref 6–12)
POTASSIUM SERPL-SCNC: 3.6 MMOL/L (ref 3.5–5.2)
PROT SERPL-MCNC: 6.9 G/DL (ref 6–8.5)
QT INTERVAL: 396 MS
QTC INTERVAL: 412 MS
RBC # BLD AUTO: 4.1 10*6/MM3 (ref 3.77–5.28)
SODIUM SERPL-SCNC: 135 MMOL/L (ref 136–145)
WBC NRBC COR # BLD: 8.15 10*3/MM3 (ref 3.4–10.8)

## 2023-10-19 PROCEDURE — 80053 COMPREHEN METABOLIC PANEL: CPT | Performed by: SURGERY

## 2023-10-19 PROCEDURE — 93010 ELECTROCARDIOGRAM REPORT: CPT | Performed by: INTERNAL MEDICINE

## 2023-10-19 PROCEDURE — 93005 ELECTROCARDIOGRAM TRACING: CPT

## 2023-10-19 PROCEDURE — 85027 COMPLETE CBC AUTOMATED: CPT | Performed by: SURGERY

## 2023-10-19 RX ORDER — ENOXAPARIN SODIUM 100 MG/ML
40 INJECTION SUBCUTANEOUS EVERY 12 HOURS SCHEDULED
Qty: 11.2 ML | Refills: 0 | Status: SHIPPED | OUTPATIENT
Start: 2023-10-19 | End: 2023-10-19 | Stop reason: SDUPTHER

## 2023-10-19 RX ORDER — ENOXAPARIN SODIUM 100 MG/ML
40 INJECTION SUBCUTANEOUS EVERY 12 HOURS SCHEDULED
Qty: 11.2 ML | Refills: 0 | Status: SHIPPED | OUTPATIENT
Start: 2023-10-19 | End: 2023-11-02

## 2023-10-19 NOTE — H&P (VIEW-ONLY)
Bariatric Consult:  Referred by Vannessa Gordillo MD    Mishel Jimenez is here today for consult on Consult (Consult sleeve)      History of Present Illness:     Mishel iJmenez is a 62 y.o. female with morbid obesity with co-morbidities including hypertension, osteoarthritis, back pain, knee pain, and GERD who presents for surgical consultation for the above procedure. Mishel has completed the initial intake visit and has been examined by our nurse practitioner, dietician, psychologist and underwent the extensive educational teaching process under the guidance of our bariatric coordinator and myself. Mishel also has seen or if has not yet will see the educational video MELVIN on the surgical procedure if available. Mishel attended today more educational teaching from our bariatric coordinator and myself. Mishel has had an extensive medical workup including a visit with their primary care physician, EKG, chest radiograph, blood work, EGD or UGI and possibly further testing. These have been reviewed by me and discussed with the patient. Mishel is now ready to proceed with surgery. Mishel presently denies nausea, vomiting, fever, chills, chest pain, shortness of air, melena, hematochezia, hemetemesis, dysuria, frequency, hematuria.     Past Medical History:   Diagnosis Date    At risk for sleep apnea     STOP BANG 5    Elevated hemoglobin A1c     HX    GERD (gastroesophageal reflux disease)     Hyperlipidemia 2010    Hypertension 2010    Shortness of breath on exertion        Encounter Diagnoses   Name Primary?    Class 3 severe obesity due to excess calories with serious comorbidity and body mass index (BMI) of 50.0 to 59.9 in adult Yes    Essential hypertension     Chronic knee pain, unspecified laterality     Other chronic back pain     Dietary counseling        Past Surgical History:   Procedure Laterality Date     SECTION      HERNIA REPAIR      ventral    LAPAROSCOPIC CHOLECYSTECTOMY           * No active  hospital problems. *      Allergies   Allergen Reactions    Demerol [Meperidine] Nausea And Vomiting         Current Outpatient Medications:     aspirin 81 MG chewable tablet, Chew 1 tablet Daily. PT HOLDING FOR SURGERY, Disp: , Rfl:     atenolol (TENORMIN) 50 MG tablet, Take 1 tablet by mouth Daily., Disp: , Rfl:     diphenhydrAMINE (BENADRYL) 25 mg capsule, Take 1 capsule by mouth 2 (Two) Times a Day., Disp: , Rfl:     hydroCHLOROthiazide (HYDRODIURIL) 50 MG tablet, Take 1 tablet by mouth Daily., Disp: , Rfl:     omeprazole (priLOSEC) 40 MG capsule, Take 1 capsule by mouth Daily., Disp: , Rfl:     spironolactone (ALDACTONE) 50 MG tablet, Take 1 tablet by mouth Daily., Disp: , Rfl:     Enoxaparin Sodium (LOVENOX) 40 MG/0.4ML solution prefilled syringe syringe, Inject 0.4 mL under the skin into the appropriate area as directed Every 12 (Twelve) Hours for 14 days. Start after surgery unless instructed otherwise, Disp: 11.2 mL, Rfl: 0    folic acid-vit B6-vit B12 (FOLBEE) 2.5-25-1 MG tablet tablet, Take 1 tablet by mouth Daily., Disp: 40 tablet, Rfl: 0    Social History     Socioeconomic History    Marital status:    Tobacco Use    Smoking status: Former     Packs/day: 1.00     Years: 30.00     Additional pack years: 0.00     Total pack years: 30.00     Types: Cigarettes     Start date: 1/1/1980     Quit date: 2/20/2020     Years since quitting: 3.6    Smokeless tobacco: Never    Tobacco comments:     Stopped when pregnant for approx 5 years and then again for an additinal 5.5 years   Vaping Use    Vaping Use: Never used   Substance and Sexual Activity    Alcohol use: Yes     Comment: Occasionally. 2-3 drinks 2-3 times a year    Drug use: Never    Sexual activity: Yes     Partners: Male     Birth control/protection: Post-menopausal, None       Family History   Problem Relation Age of Onset    Hypertension Father     Sleep apnea Father     Obesity Sister     Heart attack Maternal Grandmother     Heart attack  Maternal Grandfather     Hypertension Paternal Grandmother     Obesity Paternal Grandmother     Parkinsonism Paternal Grandfather     Malig Hyperthermia Neg Hx        Review of Systems:  Review of Systems   Constitutional:  Positive for fatigue.   Musculoskeletal:  Positive for arthralgias and back pain.   All other systems reviewed and are negative.      Physical Exam:  Vital Signs:  Weight: (!) 140 kg (308 lb)   Body mass index is 54.57 kg/m².  Temp: 98.1 °F (36.7 °C)   Heart Rate: 80   BP: 149/74     Physical Exam  Vitals reviewed.   HENT:      Head: Normocephalic and atraumatic.      Mouth/Throat:      Mouth: Mucous membranes are moist.      Pharynx: Oropharynx is clear.   Eyes:      General: No scleral icterus.     Extraocular Movements: Extraocular movements intact.      Conjunctiva/sclera: Conjunctivae normal.      Pupils: Pupils are equal, round, and reactive to light.   Neck:      Thyroid: No thyromegaly.   Cardiovascular:      Rate and Rhythm: Normal rate.   Pulmonary:      Effort: Pulmonary effort is normal. No respiratory distress.      Breath sounds: Normal breath sounds. No stridor. No wheezing or rhonchi.   Abdominal:      General: Bowel sounds are normal.      Palpations: Abdomen is soft.      Tenderness: There is no abdominal tenderness. There is no right CVA tenderness, left CVA tenderness, guarding or rebound.      Hernia: No hernia is present.   Musculoskeletal:         General: Normal range of motion.      Cervical back: Normal range of motion and neck supple.   Lymphadenopathy:      Cervical: No cervical adenopathy.   Skin:     General: Skin is warm and dry.      Findings: No erythema.   Neurological:      Mental Status: She is alert and oriented to person, place, and time.   Psychiatric:         Mood and Affect: Mood normal.         Behavior: Behavior normal.         Thought Content: Thought content normal.         Judgment: Judgment normal.         Assessment:    Mishel Jimenez is a 62 y.o.  year old female with medically complicated severe obesity with a BMI of Body mass index is 54.57 kg/m². and multiple co-morbidities listed in the encounter diagnosis.    I think she is an appropriate candidate for this surgery, and is ready to proceed.    Plan/Discussion/Summary:  No hiatal hernia per upper GI.  Patient does take PPI.  Patient status post laparoscopic ventral hernia repair with mesh 2014.       The patient has returned to the office for a surgical consultation and has requested to proceed with the laparoscopic adjustable gastric band.  I have had the opportunity to obtain a history, examine the patient and review the patient's chart.  Specifically, this patient has chosen the adjustable gastric band. The procedure could be done laparoscopically and or robotically.    The patient understands that surgery is a tool and that weight loss is not guaranteed but only seen in the context of appropriate use, regular follow up, exercise and making appropriate food choices.    I have personally discussed the potential complications of the laparoscopic adjustable gastric band procedure with this patient.  The patient is well aware of potential complications of the surgery that include but not limited to bleeding, infections, deep venous thrombosis, pulmonary embolism, pulmonary complications such as pneumonia, cardiac events, hernias, small bowel obstruction, damage to the spleen or other organs, bowel injury, disfiguring scars, failure to lose weight, need for additional surgery, conversion to an open procedure and death. The patient is aware of the potential complications which apply in particular to be adjustable gastric band and include but not limited to band slippage, band infection, erosion of the band into the stomach, pouch enlargement, esophageal dilation and/or ports complications that could result in the need for additional surgeries possibly including either temporary or even permanent removal of  the adjustable gastric band.    The risks, benefits, potential complications and alternative therapies were discussed at great length as outlined in our extensive consent forms, online consent and educational teaching processes.    The patient has confirmed the participation in the program's extensive educational activities.    All questions and concerns were answered to the patient's satisfaction.  The patient now wishes to proceed with surgery.    The patient agreed to a postoperative course of anticoagulant therapy.    I instructed patient that the surgery could be laparoscopically and/or robotically.    I instructed patient to start on a H2 blocker or proton pump inhibitor if not already on one of these medications.    I explained in detail the procedures that are in the consent.  All of these procedures have a chance to convert to open if any technical challenges or complications do occur.  Bariatric surgery is not cosmetic surgery but rather a tool to help a patient make a life-long commitment lifestyle change including diet, exercise, behavior changes, and taking supplemental vitamins and minerals.    Problems after surgery may require more operations to correct them.    The risks, benefits, alternatives, and potential complications of all of the procedures were explained in detail including, but not limited to death, anesthesia and medication adverse effect, deep venous thrombosis, pulmonary embolism, trocar site/incisional hernia, wound infection, abdominal infection, bleeding, failure to lose weight, gain weight, a change in body image, metabolic complications with vitamin deficiences and anemia.    Weight loss expectations were discussed with the patient in detail. The weight loss operations most commonly performed are the sleeve gastrectomy and the Demetria-en-Y gastric bypass. These operations result in weight losses up to approximately 25-35% of initial body weight 12 to 24 months after surgery with the  gastric bypass usually the higher percent of weight loss but depends on patient using the tool.    For the gastric bypass and loop duodenal switch (JI-S) the risks include but not limited to the following early complications:  Anastomotic leak/peritonitis, Demetria/Alimentary/biliopancreatic limb obstruction, severe & minor wound infection/seroma, and nausea/vomiting.  Late complications can include but are not limited to malnutrition, vitamin deficiencies, frequent loose stools,  stomal stenosis, marginal ulcer, bowel obstruction, intussusception, internal, and incisional hernia.    Regarding the gastric sleeve, there is higher risk of dysphagia and reflux leading to possible Powers's esophagus compared to a gastric bypass, as well as risk of internal visceral/organ injury, splenectomy, bleeding, infection, leak (which could require further intervention possible conversion to Demetria-en-Y gastric bypass), stenosis and possibility of regaining weight.    Mishel was counseled regarding diagnostic results, instructions for management, risk factor reductions, prognosis, patient and family education, impressions, risks and benefits of treatment options and importance of compliance with treatment. Total time of the encounter was over 45 minutes counseling the patient regarding the procedure as above and reviewing as well as ordering labs, medications and the procedure.  The chart was also reviewed prior to seeing the patient reviewing previous testing, studies and labs.    Mishel understands the surgical procedures and the different surgical options that are available.  She understands the lifestyle changes that are required after surgery and has agreed to follow the guidelines outlined in the weight management program.  She also expressed understanding of the risks involved and had all of female questions answered and desires to proceed.      Robe Falcon MD  10/19/2023

## 2023-10-19 NOTE — PATIENT INSTRUCTIONS
Bariatric Manual    You were provided a manual specific to the procedure that you have chosen.  Please refer to that with any questions or call the office at [phone]

## 2023-10-19 NOTE — DISCHARGE INSTRUCTIONS
Take only the following medications the morning of surgery:   ATENOLOL    Arrive to hospital on your day of surgery at 6:00 AM.    Do not take Bariatric Vitamins, Folic Acid, Actigall (if applicable) or Lovenox Injections (if applicable) the morning of surgery.  If you have a history of blood clots or have a BMI greater than 50, Dr. Falcon may order Lovenox for after surgery. Do not take Lovenox blood thinner before surgery.      General Instructions:    Liquids only the day before surgery.  Drink one 20 ounce Gatorade G2 the evening before surgery.  Nothing red in color.   The morning of surgery have another 20 ounce Gatorade G2.  Again, nothing red in color.  Your drink must be completed 2 hours before your arrival time.   Patients who avoid smoking, chewing tobacco and alcohol for 4 weeks prior to surgery have a reduced risk of post-operative complications.  Quit smoking as many days before surgery as you can.  Do not smoke, use chewing tobacco or drink alcohol the day of surgery.   Bring any papers given to you in the doctor's office.  Wear clean comfortable clothes.  Do not wear contact lenses, false eyelashes or make-up.  Bring a case for your glasses.   Bring crutches or walker if applicable.  Remove all piercings.  Leave jewelry and any other valuables at home.  Remove fingernail polish, gel overlays or any artificial nails.  Hair extensions with metal clips must be removed prior to surgery.  The Pre-Admission Testing nurse will instruct you to bring medications if unable to obtain an accurate list in Pre-Admission Testing.    If you were given a blood bank ID arm band remember to bring it with you the day of surgery.    Preventing a Surgical Site Infection:  For 2 to 3 days before surgery, avoid shaving with a razor because the razor can irritate skin and make it easier to develop an infection.    Any areas of open skin can increase the risk of a post-operative wound infection by allowing bacteria to enter  and travel throughout the body.  Notify your surgeon if you have any skin wounds / rashes even if it is not near the expected surgical site.  The area will need assessed to determine if surgery should be delayed until it is healed.  2 days prior to surgery, take a shower using a fresh bar of anti-bacterial soap (such as Dial).  Use a clean washcloth and dry with a clean towel.    The day prior to surgery, take a shower using a fresh bar of anti-bacterial soap (such as Dial).  Use a clean washcloth and dry with a clean towel.  Sleep in a clean bed with clean clothing.  Do not allow pets to sleep with you.  The morning of surgery shower using a fresh bar of anti-bacterial soap (such as Dial).  Use a clean washcloth and dry with a clean towel.  Follow the Chlorhexidine instructions below.    CHLORHEXIDINE CLOTH INSTRUCTIONS  The morning of surgery follow these instructions using the Chlorhexidine cloths you've been given.  These steps reduce bacteria on the body.  Do not use the cloths near your eyes, ears mouth, genitalia or on open wounds.  Throw the cloths away after use but do not try to flush them down a toilet.    Open and remove one cloth at a time from the package.    Leave the cloth unfolded and begin the bathing.  Massage the skin with the cloths using gentle pressure to remove bacteria.  Do not scrub harshly.   Follow the steps below with one 2% CHG cloth per area (6 total cloths).  One cloth for neck, shoulders and chest.  One cloth for both arms, hands, fingers and underarms (do underarms last).  One cloth for the abdomen followed by groin.  One cloth for right leg and foot including between the toes.  One cloth for left leg and foot including between the toes.  The last cloth is to be used for the back of the neck, back and buttocks.    Allow the CHG to air dry 3 minutes on the skin which will give it time to work and decrease the chance of irritation.  The skin may feel sticky until it is dry.  Do not  rinse with water or any other liquid or you will lose the beneficial effects of the CHG.  If mild skin irritation occurs, do rinse the skin to remove the CHG.  Report this to the nurse at time of admission.  Do not apply lotions, creams, ointments, deodorants or perfumes after using the clothes. Dress in clean clothes before coming to the hospital.    Ask your surgeon if you will be receiving antibiotics prior to surgery.  Make sure you, your family, and all healthcare providers clean their hands with soap and water or an alcohol based hand  before caring for you or your wound.      Day of surgery:  Your arrival time is approximately two hours before your scheduled surgery time.  Upon arrival, a Pre-op nurse and Anesthesiologist will review your health history, obtain vital signs, and answer questions you may have.  A Pre-op nurse will start an IV and you may receive medication in preparation for surgery, including something to help you relax.  If applicable, we do ask that you have your C-PAP/BI-PAP machine available. It can be utilized the night of surgery.     Please be aware that surgery does come with discomfort.  We want to make every effort to control your discomfort so please discuss any uncontrolled symptoms with your nurse.   Your doctor will most likely have prescribed pain medications.      If you are going home after surgery you will receive individualized written care instructions before being discharged.  A responsible adult must drive you to and from the hospital on the day of your surgery and stay with you for 24 hours.  Discharge prescriptions can be filled by the hospital pharmacy during regular pharmacy hours.  If you are having surgery late in the day/evening your prescription may be e-prescribed to your pharmacy.  Please verify your pharmacy hours or chose a 24 hour pharmacy to avoid not having access to your prescription because your pharmacy has closed for the day.    If you are  staying overnight following surgery, you will be transported to your hospital room following the recovery period.  [facility] has all private rooms.    If you have any questions please call Pre-Admission Testing at [phone].  Deductibles and co-payments are collected on the day of service. Please be prepared to pay the required co-pay, deductible or deposit on the day of service as defined by your plan.    Call your surgeon immediately if you experience any of the following symptoms:  Sore Throat  Shortness of Breath or difficulty breathing  Cough  Chills  Body soreness or muscle pain  Headache  Fever  New loss of taste or smell  Do not arrive for your surgery ill.  Your procedure will need to be rescheduled to another time.  You will need to call your physician before the day of surgery to avoid any unnecessary exposure to hospital staff as well as other patients.

## 2023-10-19 NOTE — H&P
Bariatric Consult:  Referred by Vannessa Gordillo MD    Mishel Jimenez is here today for consult on Consult (Consult sleeve)      History of Present Illness:     Mishel Jimenez is a 62 y.o. female with morbid obesity with co-morbidities including hypertension, osteoarthritis, back pain, knee pain, and GERD who presents for surgical consultation for the above procedure. Mishel has completed the initial intake visit and has been examined by our nurse practitioner, dietician, psychologist and underwent the extensive educational teaching process under the guidance of our bariatric coordinator and myself. Mishel also has seen or if has not yet will see the educational video MELVIN on the surgical procedure if available. Mishel attended today more educational teaching from our bariatric coordinator and myself. Mishel has had an extensive medical workup including a visit with their primary care physician, EKG, chest radiograph, blood work, EGD or UGI and possibly further testing. These have been reviewed by me and discussed with the patient. Mishel is now ready to proceed with surgery. Mishel presently denies nausea, vomiting, fever, chills, chest pain, shortness of air, melena, hematochezia, hemetemesis, dysuria, frequency, hematuria.     Past Medical History:   Diagnosis Date    At risk for sleep apnea     STOP BANG 5    Elevated hemoglobin A1c     HX    GERD (gastroesophageal reflux disease)     Hyperlipidemia 2010    Hypertension 2010    Shortness of breath on exertion        Encounter Diagnoses   Name Primary?    Class 3 severe obesity due to excess calories with serious comorbidity and body mass index (BMI) of 50.0 to 59.9 in adult Yes    Essential hypertension     Chronic knee pain, unspecified laterality     Other chronic back pain     Dietary counseling        Past Surgical History:   Procedure Laterality Date     SECTION      HERNIA REPAIR      ventral    LAPAROSCOPIC CHOLECYSTECTOMY           * No active  hospital problems. *      Allergies   Allergen Reactions    Demerol [Meperidine] Nausea And Vomiting         Current Outpatient Medications:     aspirin 81 MG chewable tablet, Chew 1 tablet Daily. PT HOLDING FOR SURGERY, Disp: , Rfl:     atenolol (TENORMIN) 50 MG tablet, Take 1 tablet by mouth Daily., Disp: , Rfl:     diphenhydrAMINE (BENADRYL) 25 mg capsule, Take 1 capsule by mouth 2 (Two) Times a Day., Disp: , Rfl:     hydroCHLOROthiazide (HYDRODIURIL) 50 MG tablet, Take 1 tablet by mouth Daily., Disp: , Rfl:     omeprazole (priLOSEC) 40 MG capsule, Take 1 capsule by mouth Daily., Disp: , Rfl:     spironolactone (ALDACTONE) 50 MG tablet, Take 1 tablet by mouth Daily., Disp: , Rfl:     Enoxaparin Sodium (LOVENOX) 40 MG/0.4ML solution prefilled syringe syringe, Inject 0.4 mL under the skin into the appropriate area as directed Every 12 (Twelve) Hours for 14 days. Start after surgery unless instructed otherwise, Disp: 11.2 mL, Rfl: 0    folic acid-vit B6-vit B12 (FOLBEE) 2.5-25-1 MG tablet tablet, Take 1 tablet by mouth Daily., Disp: 40 tablet, Rfl: 0    Social History     Socioeconomic History    Marital status:    Tobacco Use    Smoking status: Former     Packs/day: 1.00     Years: 30.00     Additional pack years: 0.00     Total pack years: 30.00     Types: Cigarettes     Start date: 1/1/1980     Quit date: 2/20/2020     Years since quitting: 3.6    Smokeless tobacco: Never    Tobacco comments:     Stopped when pregnant for approx 5 years and then again for an additinal 5.5 years   Vaping Use    Vaping Use: Never used   Substance and Sexual Activity    Alcohol use: Yes     Comment: Occasionally. 2-3 drinks 2-3 times a year    Drug use: Never    Sexual activity: Yes     Partners: Male     Birth control/protection: Post-menopausal, None       Family History   Problem Relation Age of Onset    Hypertension Father     Sleep apnea Father     Obesity Sister     Heart attack Maternal Grandmother     Heart attack  Maternal Grandfather     Hypertension Paternal Grandmother     Obesity Paternal Grandmother     Parkinsonism Paternal Grandfather     Malig Hyperthermia Neg Hx        Review of Systems:  Review of Systems   Constitutional:  Positive for fatigue.   Musculoskeletal:  Positive for arthralgias and back pain.   All other systems reviewed and are negative.      Physical Exam:  Vital Signs:  Weight: (!) 140 kg (308 lb)   Body mass index is 54.57 kg/m².  Temp: 98.1 °F (36.7 °C)   Heart Rate: 80   BP: 149/74     Physical Exam  Vitals reviewed.   HENT:      Head: Normocephalic and atraumatic.      Mouth/Throat:      Mouth: Mucous membranes are moist.      Pharynx: Oropharynx is clear.   Eyes:      General: No scleral icterus.     Extraocular Movements: Extraocular movements intact.      Conjunctiva/sclera: Conjunctivae normal.      Pupils: Pupils are equal, round, and reactive to light.   Neck:      Thyroid: No thyromegaly.   Cardiovascular:      Rate and Rhythm: Normal rate.   Pulmonary:      Effort: Pulmonary effort is normal. No respiratory distress.      Breath sounds: Normal breath sounds. No stridor. No wheezing or rhonchi.   Abdominal:      General: Bowel sounds are normal.      Palpations: Abdomen is soft.      Tenderness: There is no abdominal tenderness. There is no right CVA tenderness, left CVA tenderness, guarding or rebound.      Hernia: No hernia is present.   Musculoskeletal:         General: Normal range of motion.      Cervical back: Normal range of motion and neck supple.   Lymphadenopathy:      Cervical: No cervical adenopathy.   Skin:     General: Skin is warm and dry.      Findings: No erythema.   Neurological:      Mental Status: She is alert and oriented to person, place, and time.   Psychiatric:         Mood and Affect: Mood normal.         Behavior: Behavior normal.         Thought Content: Thought content normal.         Judgment: Judgment normal.         Assessment:    Mishel Jimenez is a 62 y.o.  year old female with medically complicated severe obesity with a BMI of Body mass index is 54.57 kg/m². and multiple co-morbidities listed in the encounter diagnosis.    I think she is an appropriate candidate for this surgery, and is ready to proceed.    Plan/Discussion/Summary:  No hiatal hernia per upper GI.  Patient does take PPI.  Patient status post laparoscopic ventral hernia repair with mesh 2014.       The patient has returned to the office for a surgical consultation and has requested to proceed with the laparoscopic adjustable gastric band.  I have had the opportunity to obtain a history, examine the patient and review the patient's chart.  Specifically, this patient has chosen the adjustable gastric band. The procedure could be done laparoscopically and or robotically.    The patient understands that surgery is a tool and that weight loss is not guaranteed but only seen in the context of appropriate use, regular follow up, exercise and making appropriate food choices.    I have personally discussed the potential complications of the laparoscopic adjustable gastric band procedure with this patient.  The patient is well aware of potential complications of the surgery that include but not limited to bleeding, infections, deep venous thrombosis, pulmonary embolism, pulmonary complications such as pneumonia, cardiac events, hernias, small bowel obstruction, damage to the spleen or other organs, bowel injury, disfiguring scars, failure to lose weight, need for additional surgery, conversion to an open procedure and death. The patient is aware of the potential complications which apply in particular to be adjustable gastric band and include but not limited to band slippage, band infection, erosion of the band into the stomach, pouch enlargement, esophageal dilation and/or ports complications that could result in the need for additional surgeries possibly including either temporary or even permanent removal of  the adjustable gastric band.    The risks, benefits, potential complications and alternative therapies were discussed at great length as outlined in our extensive consent forms, online consent and educational teaching processes.    The patient has confirmed the participation in the program's extensive educational activities.    All questions and concerns were answered to the patient's satisfaction.  The patient now wishes to proceed with surgery.    The patient agreed to a postoperative course of anticoagulant therapy.    I instructed patient that the surgery could be laparoscopically and/or robotically.    I instructed patient to start on a H2 blocker or proton pump inhibitor if not already on one of these medications.    I explained in detail the procedures that are in the consent.  All of these procedures have a chance to convert to open if any technical challenges or complications do occur.  Bariatric surgery is not cosmetic surgery but rather a tool to help a patient make a life-long commitment lifestyle change including diet, exercise, behavior changes, and taking supplemental vitamins and minerals.    Problems after surgery may require more operations to correct them.    The risks, benefits, alternatives, and potential complications of all of the procedures were explained in detail including, but not limited to death, anesthesia and medication adverse effect, deep venous thrombosis, pulmonary embolism, trocar site/incisional hernia, wound infection, abdominal infection, bleeding, failure to lose weight, gain weight, a change in body image, metabolic complications with vitamin deficiences and anemia.    Weight loss expectations were discussed with the patient in detail. The weight loss operations most commonly performed are the sleeve gastrectomy and the Demetria-en-Y gastric bypass. These operations result in weight losses up to approximately 25-35% of initial body weight 12 to 24 months after surgery with the  gastric bypass usually the higher percent of weight loss but depends on patient using the tool.    For the gastric bypass and loop duodenal switch (JI-S) the risks include but not limited to the following early complications:  Anastomotic leak/peritonitis, Demetria/Alimentary/biliopancreatic limb obstruction, severe & minor wound infection/seroma, and nausea/vomiting.  Late complications can include but are not limited to malnutrition, vitamin deficiencies, frequent loose stools,  stomal stenosis, marginal ulcer, bowel obstruction, intussusception, internal, and incisional hernia.    Regarding the gastric sleeve, there is higher risk of dysphagia and reflux leading to possible Powers's esophagus compared to a gastric bypass, as well as risk of internal visceral/organ injury, splenectomy, bleeding, infection, leak (which could require further intervention possible conversion to Demetria-en-Y gastric bypass), stenosis and possibility of regaining weight.    Mishel was counseled regarding diagnostic results, instructions for management, risk factor reductions, prognosis, patient and family education, impressions, risks and benefits of treatment options and importance of compliance with treatment. Total time of the encounter was over 45 minutes counseling the patient regarding the procedure as above and reviewing as well as ordering labs, medications and the procedure.  The chart was also reviewed prior to seeing the patient reviewing previous testing, studies and labs.    Mishel understands the surgical procedures and the different surgical options that are available.  She understands the lifestyle changes that are required after surgery and has agreed to follow the guidelines outlined in the weight management program.  She also expressed understanding of the risks involved and had all of female questions answered and desires to proceed.      Robe Falcon MD  10/19/2023

## 2023-10-23 ENCOUNTER — ANESTHESIA (OUTPATIENT)
Dept: PERIOP | Facility: HOSPITAL | Age: 63
End: 2023-10-23
Payer: COMMERCIAL

## 2023-10-23 ENCOUNTER — ANESTHESIA EVENT (OUTPATIENT)
Dept: PERIOP | Facility: HOSPITAL | Age: 63
End: 2023-10-23
Payer: COMMERCIAL

## 2023-10-23 ENCOUNTER — HOSPITAL ENCOUNTER (OUTPATIENT)
Facility: HOSPITAL | Age: 63
Setting detail: HOSPITAL OUTPATIENT SURGERY
Discharge: HOME | End: 2023-10-23
Attending: SURGERY | Admitting: SURGERY
Payer: COMMERCIAL

## 2023-10-23 VITALS
TEMPERATURE: 97.6 F | HEART RATE: 71 BPM | DIASTOLIC BLOOD PRESSURE: 71 MMHG | WEIGHT: 293 LBS | BODY MASS INDEX: 51.91 KG/M2 | OXYGEN SATURATION: 98 % | RESPIRATION RATE: 16 BRPM | HEIGHT: 63 IN | SYSTOLIC BLOOD PRESSURE: 128 MMHG

## 2023-10-23 DIAGNOSIS — Z71.3 DIETARY COUNSELING: Primary | ICD-10-CM

## 2023-10-23 DIAGNOSIS — I10 ESSENTIAL HYPERTENSION: ICD-10-CM

## 2023-10-23 DIAGNOSIS — R53.1 WEAKNESS: ICD-10-CM

## 2023-10-23 DIAGNOSIS — R06.83 SNORING: ICD-10-CM

## 2023-10-23 DIAGNOSIS — M25.473 ANKLE EDEMA: ICD-10-CM

## 2023-10-23 DIAGNOSIS — G89.29 CHRONIC KNEE PAIN, UNSPECIFIED LATERALITY: ICD-10-CM

## 2023-10-23 DIAGNOSIS — E78.49 OTHER HYPERLIPIDEMIA: ICD-10-CM

## 2023-10-23 DIAGNOSIS — E66.01 CLASS 3 SEVERE OBESITY DUE TO EXCESS CALORIES WITH SERIOUS COMORBIDITY AND BODY MASS INDEX (BMI) OF 50.0 TO 59.9 IN ADULT: ICD-10-CM

## 2023-10-23 DIAGNOSIS — M25.569 CHRONIC KNEE PAIN, UNSPECIFIED LATERALITY: ICD-10-CM

## 2023-10-23 DIAGNOSIS — I49.1 PAC (PREMATURE ATRIAL CONTRACTION): ICD-10-CM

## 2023-10-23 DIAGNOSIS — I73.9 INTERMITTENT CLAUDICATION: ICD-10-CM

## 2023-10-23 DIAGNOSIS — R06.02 SHORTNESS OF BREATH: ICD-10-CM

## 2023-10-23 LAB — GLUCOSE BLDC GLUCOMTR-MCNC: 131 MG/DL (ref 70–130)

## 2023-10-23 PROCEDURE — 25010000002 SUCCINYLCHOLINE PER 20 MG: Performed by: NURSE ANESTHETIST, CERTIFIED REGISTERED

## 2023-10-23 PROCEDURE — 25010000002 DROPERIDOL PER 5 MG: Performed by: NURSE ANESTHETIST, CERTIFIED REGISTERED

## 2023-10-23 PROCEDURE — 25010000002 CEFAZOLIN PER 500 MG: Performed by: SURGERY

## 2023-10-23 PROCEDURE — 25010000002 KETOROLAC TROMETHAMINE PER 15 MG: Performed by: SURGERY

## 2023-10-23 PROCEDURE — 25010000002 EPINEPHRINE 1 MG/ML SOLUTION 30 ML VIAL: Performed by: SURGERY

## 2023-10-23 PROCEDURE — 25810000003 LACTATED RINGERS PER 1000 ML: Performed by: SURGERY

## 2023-10-23 PROCEDURE — 25010000002 PROPOFOL 200 MG/20ML EMULSION: Performed by: NURSE ANESTHETIST, CERTIFIED REGISTERED

## 2023-10-23 PROCEDURE — 25010000002 THIAMINE HCL 200 MG/2ML SOLUTION: Performed by: SURGERY

## 2023-10-23 PROCEDURE — C1889 IMPLANT/INSERT DEVICE, NOC: HCPCS | Performed by: SURGERY

## 2023-10-23 PROCEDURE — 25010000002 ONDANSETRON PER 1 MG: Performed by: NURSE ANESTHETIST, CERTIFIED REGISTERED

## 2023-10-23 PROCEDURE — 25010000002 ROPIVACAINE PER 1 MG: Performed by: SURGERY

## 2023-10-23 PROCEDURE — 25010000002 METOCLOPRAMIDE PER 10 MG: Performed by: SURGERY

## 2023-10-23 PROCEDURE — 82948 REAGENT STRIP/BLOOD GLUCOSE: CPT

## 2023-10-23 PROCEDURE — 25010000002 ACETAMINOPHEN 10 MG/ML SOLUTION: Performed by: NURSE ANESTHETIST, CERTIFIED REGISTERED

## 2023-10-23 PROCEDURE — 25010000002 ENOXAPARIN PER 10 MG: Performed by: SURGERY

## 2023-10-23 PROCEDURE — 25010000002 DEXAMETHASONE SODIUM PHOSPHATE 20 MG/5ML SOLUTION: Performed by: NURSE ANESTHETIST, CERTIFIED REGISTERED

## 2023-10-23 PROCEDURE — 43770 LAP PLACE GASTR ADJ DEVICE: CPT | Performed by: NURSE PRACTITIONER

## 2023-10-23 PROCEDURE — 25810000003 LACTATED RINGERS SOLUTION: Performed by: SURGERY

## 2023-10-23 PROCEDURE — 43770 LAP PLACE GASTR ADJ DEVICE: CPT | Performed by: SURGERY

## 2023-10-23 PROCEDURE — 25010000002 CLONIDINE PER 1 MG: Performed by: SURGERY

## 2023-10-23 PROCEDURE — 25010000002 HYDROMORPHONE PER 4 MG: Performed by: NURSE ANESTHETIST, CERTIFIED REGISTERED

## 2023-10-23 PROCEDURE — 25010000002 MAGNESIUM SULFATE PER 500 MG OF MAGNESIUM: Performed by: NURSE ANESTHETIST, CERTIFIED REGISTERED

## 2023-10-23 PROCEDURE — 25010000002 SUGAMMADEX 200 MG/2ML SOLUTION: Performed by: NURSE ANESTHETIST, CERTIFIED REGISTERED

## 2023-10-23 PROCEDURE — 25010000002 PROPOFOL 10 MG/ML EMULSION: Performed by: NURSE ANESTHETIST, CERTIFIED REGISTERED

## 2023-10-23 DEVICE — LAP-BAND® AP ADJUSTABLE GASTRIC BANDING SYSTEM LARGE WITH RAPIDPORT EZ
Type: IMPLANTABLE DEVICE | Site: ABDOMEN | Status: FUNCTIONAL
Brand: LAP-BAND®  AP SYSTEM LARGE WITH RAPIDPORT® EZ

## 2023-10-23 RX ORDER — ROCURONIUM BROMIDE 10 MG/ML
INJECTION, SOLUTION INTRAVENOUS AS NEEDED
Status: DISCONTINUED | OUTPATIENT
Start: 2023-10-23 | End: 2023-10-23 | Stop reason: SURG

## 2023-10-23 RX ORDER — EPHEDRINE SULFATE 50 MG/ML
5 INJECTION, SOLUTION INTRAVENOUS ONCE AS NEEDED
Status: DISCONTINUED | OUTPATIENT
Start: 2023-10-23 | End: 2023-10-23 | Stop reason: HOSPADM

## 2023-10-23 RX ORDER — CEFAZOLIN SODIUM IN 0.9 % NACL 3 G/100 ML
3 INTRAVENOUS SOLUTION, PIGGYBACK (ML) INTRAVENOUS ONCE
Status: COMPLETED | OUTPATIENT
Start: 2023-10-23 | End: 2023-10-23

## 2023-10-23 RX ORDER — PROPOFOL 10 MG/ML
INJECTION, EMULSION INTRAVENOUS AS NEEDED
Status: DISCONTINUED | OUTPATIENT
Start: 2023-10-23 | End: 2023-10-23 | Stop reason: SURG

## 2023-10-23 RX ORDER — SODIUM CHLORIDE 0.9 % (FLUSH) 0.9 %
10 SYRINGE (ML) INJECTION EVERY 12 HOURS SCHEDULED
Status: DISCONTINUED | OUTPATIENT
Start: 2023-10-23 | End: 2023-10-23 | Stop reason: HOSPADM

## 2023-10-23 RX ORDER — IPRATROPIUM BROMIDE AND ALBUTEROL SULFATE 2.5; .5 MG/3ML; MG/3ML
3 SOLUTION RESPIRATORY (INHALATION) ONCE AS NEEDED
Status: DISCONTINUED | OUTPATIENT
Start: 2023-10-23 | End: 2023-10-23 | Stop reason: HOSPADM

## 2023-10-23 RX ORDER — ENOXAPARIN SODIUM 100 MG/ML
40 INJECTION SUBCUTANEOUS
Status: COMPLETED | OUTPATIENT
Start: 2023-10-23 | End: 2023-10-23

## 2023-10-23 RX ORDER — THIAMINE HYDROCHLORIDE 100 MG/ML
100 INJECTION, SOLUTION INTRAMUSCULAR; INTRAVENOUS ONCE
Status: COMPLETED | OUTPATIENT
Start: 2023-10-23 | End: 2023-10-23

## 2023-10-23 RX ORDER — ONDANSETRON 2 MG/ML
INJECTION INTRAMUSCULAR; INTRAVENOUS AS NEEDED
Status: DISCONTINUED | OUTPATIENT
Start: 2023-10-23 | End: 2023-10-23 | Stop reason: SURG

## 2023-10-23 RX ORDER — NALOXONE HCL 0.4 MG/ML
0.2 VIAL (ML) INJECTION AS NEEDED
Status: DISCONTINUED | OUTPATIENT
Start: 2023-10-23 | End: 2023-10-23 | Stop reason: HOSPADM

## 2023-10-23 RX ORDER — PROMETHAZINE HYDROCHLORIDE 25 MG/1
25 TABLET ORAL ONCE AS NEEDED
Status: DISCONTINUED | OUTPATIENT
Start: 2023-10-23 | End: 2023-10-23 | Stop reason: HOSPADM

## 2023-10-23 RX ORDER — ACETAMINOPHEN 500 MG
1000 TABLET ORAL EVERY 6 HOURS
Status: DISCONTINUED | OUTPATIENT
Start: 2023-10-23 | End: 2023-10-23 | Stop reason: HOSPADM

## 2023-10-23 RX ORDER — PROMETHAZINE HYDROCHLORIDE 25 MG/1
25 SUPPOSITORY RECTAL ONCE AS NEEDED
Status: DISCONTINUED | OUTPATIENT
Start: 2023-10-23 | End: 2023-10-23 | Stop reason: HOSPADM

## 2023-10-23 RX ORDER — LABETALOL HYDROCHLORIDE 5 MG/ML
5 INJECTION, SOLUTION INTRAVENOUS
Status: DISCONTINUED | OUTPATIENT
Start: 2023-10-23 | End: 2023-10-23 | Stop reason: HOSPADM

## 2023-10-23 RX ORDER — LIDOCAINE HYDROCHLORIDE 20 MG/ML
INJECTION, SOLUTION EPIDURAL; INFILTRATION; INTRACAUDAL; PERINEURAL AS NEEDED
Status: DISCONTINUED | OUTPATIENT
Start: 2023-10-23 | End: 2023-10-23 | Stop reason: SURG

## 2023-10-23 RX ORDER — GABAPENTIN 300 MG/1
300 CAPSULE ORAL ONCE
Status: COMPLETED | OUTPATIENT
Start: 2023-10-23 | End: 2023-10-23

## 2023-10-23 RX ORDER — KETAMINE HCL IN NACL, ISO-OSM 100MG/10ML
SYRINGE (ML) INJECTION AS NEEDED
Status: DISCONTINUED | OUTPATIENT
Start: 2023-10-23 | End: 2023-10-23 | Stop reason: SURG

## 2023-10-23 RX ORDER — LIDOCAINE HYDROCHLORIDE 10 MG/ML
0.5 INJECTION, SOLUTION INFILTRATION; PERINEURAL ONCE AS NEEDED
Status: DISCONTINUED | OUTPATIENT
Start: 2023-10-23 | End: 2023-10-23 | Stop reason: HOSPADM

## 2023-10-23 RX ORDER — CHLORHEXIDINE GLUCONATE ORAL RINSE 1.2 MG/ML
15 SOLUTION DENTAL SEE ADMIN INSTRUCTIONS
Status: DISCONTINUED | OUTPATIENT
Start: 2023-10-23 | End: 2023-10-23 | Stop reason: HOSPADM

## 2023-10-23 RX ORDER — DIPHENHYDRAMINE HYDROCHLORIDE 50 MG/ML
25 INJECTION INTRAMUSCULAR; INTRAVENOUS EVERY 4 HOURS PRN
Status: DISCONTINUED | OUTPATIENT
Start: 2023-10-23 | End: 2023-10-23 | Stop reason: HOSPADM

## 2023-10-23 RX ORDER — METOCLOPRAMIDE HYDROCHLORIDE 5 MG/ML
10 INJECTION INTRAMUSCULAR; INTRAVENOUS ONCE
Status: COMPLETED | OUTPATIENT
Start: 2023-10-23 | End: 2023-10-23

## 2023-10-23 RX ORDER — HYDROMORPHONE HYDROCHLORIDE 1 MG/ML
0.5 INJECTION, SOLUTION INTRAMUSCULAR; INTRAVENOUS; SUBCUTANEOUS
Status: DISCONTINUED | OUTPATIENT
Start: 2023-10-23 | End: 2023-10-23 | Stop reason: HOSPADM

## 2023-10-23 RX ORDER — DROPERIDOL 2.5 MG/ML
0.62 INJECTION, SOLUTION INTRAMUSCULAR; INTRAVENOUS
Status: DISCONTINUED | OUTPATIENT
Start: 2023-10-23 | End: 2023-10-23 | Stop reason: HOSPADM

## 2023-10-23 RX ORDER — DIPHENHYDRAMINE HYDROCHLORIDE 50 MG/ML
12.5 INJECTION INTRAMUSCULAR; INTRAVENOUS
Status: DISCONTINUED | OUTPATIENT
Start: 2023-10-23 | End: 2023-10-23 | Stop reason: HOSPADM

## 2023-10-23 RX ORDER — DEXAMETHASONE SODIUM PHOSPHATE 4 MG/ML
INJECTION, SOLUTION INTRA-ARTICULAR; INTRALESIONAL; INTRAMUSCULAR; INTRAVENOUS; SOFT TISSUE AS NEEDED
Status: DISCONTINUED | OUTPATIENT
Start: 2023-10-23 | End: 2023-10-23 | Stop reason: SURG

## 2023-10-23 RX ORDER — HYDROCODONE BITARTRATE AND ACETAMINOPHEN 5; 325 MG/1; MG/1
1 TABLET ORAL ONCE AS NEEDED
Status: DISCONTINUED | OUTPATIENT
Start: 2023-10-23 | End: 2023-10-23 | Stop reason: HOSPADM

## 2023-10-23 RX ORDER — PANTOPRAZOLE SODIUM 40 MG/10ML
40 INJECTION, POWDER, LYOPHILIZED, FOR SOLUTION INTRAVENOUS ONCE
Status: COMPLETED | OUTPATIENT
Start: 2023-10-23 | End: 2023-10-23

## 2023-10-23 RX ORDER — SODIUM CHLORIDE 0.9 % (FLUSH) 0.9 %
1-10 SYRINGE (ML) INJECTION AS NEEDED
Status: DISCONTINUED | OUTPATIENT
Start: 2023-10-23 | End: 2023-10-23 | Stop reason: HOSPADM

## 2023-10-23 RX ORDER — SODIUM CHLORIDE, SODIUM LACTATE, POTASSIUM CHLORIDE, CALCIUM CHLORIDE 600; 310; 30; 20 MG/100ML; MG/100ML; MG/100ML; MG/100ML
100 INJECTION, SOLUTION INTRAVENOUS CONTINUOUS
Status: DISCONTINUED | OUTPATIENT
Start: 2023-10-23 | End: 2023-10-23 | Stop reason: HOSPADM

## 2023-10-23 RX ORDER — ONDANSETRON 2 MG/ML
4 INJECTION INTRAMUSCULAR; INTRAVENOUS ONCE AS NEEDED
Status: DISCONTINUED | OUTPATIENT
Start: 2023-10-23 | End: 2023-10-23 | Stop reason: HOSPADM

## 2023-10-23 RX ORDER — SODIUM CHLORIDE 0.9 % (FLUSH) 0.9 %
3 SYRINGE (ML) INJECTION EVERY 12 HOURS SCHEDULED
Status: DISCONTINUED | OUTPATIENT
Start: 2023-10-23 | End: 2023-10-23 | Stop reason: HOSPADM

## 2023-10-23 RX ORDER — FLUMAZENIL 0.1 MG/ML
0.2 INJECTION INTRAVENOUS AS NEEDED
Status: DISCONTINUED | OUTPATIENT
Start: 2023-10-23 | End: 2023-10-23 | Stop reason: HOSPADM

## 2023-10-23 RX ORDER — SUCCINYLCHOLINE CHLORIDE 20 MG/ML
INJECTION INTRAMUSCULAR; INTRAVENOUS AS NEEDED
Status: DISCONTINUED | OUTPATIENT
Start: 2023-10-23 | End: 2023-10-23 | Stop reason: SURG

## 2023-10-23 RX ORDER — MAGNESIUM HYDROXIDE 1200 MG/15ML
LIQUID ORAL AS NEEDED
Status: DISCONTINUED | OUTPATIENT
Start: 2023-10-23 | End: 2023-10-23 | Stop reason: HOSPADM

## 2023-10-23 RX ORDER — ACETAMINOPHEN 160 MG/5ML
975 SOLUTION ORAL EVERY 6 HOURS
Status: DISCONTINUED | OUTPATIENT
Start: 2023-10-23 | End: 2023-10-23 | Stop reason: HOSPADM

## 2023-10-23 RX ORDER — SODIUM CHLORIDE 0.9 % (FLUSH) 0.9 %
3-10 SYRINGE (ML) INJECTION AS NEEDED
Status: DISCONTINUED | OUTPATIENT
Start: 2023-10-23 | End: 2023-10-23 | Stop reason: HOSPADM

## 2023-10-23 RX ORDER — ONDANSETRON 4 MG/1
4 TABLET, ORALLY DISINTEGRATING ORAL EVERY 4 HOURS PRN
Qty: 30 TABLET | Refills: 0 | Status: SHIPPED | OUTPATIENT
Start: 2023-10-23

## 2023-10-23 RX ORDER — ACETAMINOPHEN 10 MG/ML
INJECTION, SOLUTION INTRAVENOUS AS NEEDED
Status: DISCONTINUED | OUTPATIENT
Start: 2023-10-23 | End: 2023-10-23 | Stop reason: SURG

## 2023-10-23 RX ORDER — SCOLOPAMINE TRANSDERMAL SYSTEM 1 MG/1
1 PATCH, EXTENDED RELEASE TRANSDERMAL CONTINUOUS
Status: DISCONTINUED | OUTPATIENT
Start: 2023-10-23 | End: 2023-10-23 | Stop reason: HOSPADM

## 2023-10-23 RX ORDER — SODIUM CHLORIDE, SODIUM LACTATE, POTASSIUM CHLORIDE, CALCIUM CHLORIDE 600; 310; 30; 20 MG/100ML; MG/100ML; MG/100ML; MG/100ML
9 INJECTION, SOLUTION INTRAVENOUS CONTINUOUS
Status: DISCONTINUED | OUTPATIENT
Start: 2023-10-23 | End: 2023-10-23 | Stop reason: HOSPADM

## 2023-10-23 RX ORDER — MAGNESIUM SULFATE HEPTAHYDRATE 500 MG/ML
INJECTION, SOLUTION INTRAMUSCULAR; INTRAVENOUS AS NEEDED
Status: DISCONTINUED | OUTPATIENT
Start: 2023-10-23 | End: 2023-10-23 | Stop reason: SURG

## 2023-10-23 RX ORDER — OXYCODONE AND ACETAMINOPHEN 7.5; 325 MG/1; MG/1
1 TABLET ORAL EVERY 4 HOURS PRN
Status: DISCONTINUED | OUTPATIENT
Start: 2023-10-23 | End: 2023-10-23 | Stop reason: HOSPADM

## 2023-10-23 RX ORDER — FENTANYL CITRATE 50 UG/ML
50 INJECTION, SOLUTION INTRAMUSCULAR; INTRAVENOUS
Status: DISCONTINUED | OUTPATIENT
Start: 2023-10-23 | End: 2023-10-23 | Stop reason: HOSPADM

## 2023-10-23 RX ORDER — TRAMADOL HYDROCHLORIDE 50 MG/1
50 TABLET ORAL EVERY 6 HOURS PRN
Qty: 12 TABLET | Refills: 0 | Status: SHIPPED | OUTPATIENT
Start: 2023-10-23

## 2023-10-23 RX ORDER — SODIUM CHLORIDE 9 MG/ML
40 INJECTION, SOLUTION INTRAVENOUS AS NEEDED
Status: DISCONTINUED | OUTPATIENT
Start: 2023-10-23 | End: 2023-10-23 | Stop reason: HOSPADM

## 2023-10-23 RX ORDER — HYDRALAZINE HYDROCHLORIDE 20 MG/ML
5 INJECTION INTRAMUSCULAR; INTRAVENOUS
Status: DISCONTINUED | OUTPATIENT
Start: 2023-10-23 | End: 2023-10-23 | Stop reason: HOSPADM

## 2023-10-23 RX ADMIN — SODIUM CHLORIDE, POTASSIUM CHLORIDE, SODIUM LACTATE AND CALCIUM CHLORIDE: 600; 310; 30; 20 INJECTION, SOLUTION INTRAVENOUS at 08:47

## 2023-10-23 RX ADMIN — PROPOFOL 150 MG: 10 INJECTION, EMULSION INTRAVENOUS at 07:55

## 2023-10-23 RX ADMIN — ONDANSETRON 4 MG: 2 INJECTION INTRAMUSCULAR; INTRAVENOUS at 08:36

## 2023-10-23 RX ADMIN — ENOXAPARIN SODIUM 40 MG: 100 INJECTION SUBCUTANEOUS at 07:39

## 2023-10-23 RX ADMIN — GABAPENTIN 300 MG: 300 CAPSULE ORAL at 07:01

## 2023-10-23 RX ADMIN — 0.12% CHLORHEXIDINE GLUCONATE 15 ML: 1.2 RINSE ORAL at 07:01

## 2023-10-23 RX ADMIN — LIDOCAINE HYDROCHLORIDE 60 MG: 20 INJECTION, SOLUTION EPIDURAL; INFILTRATION; INTRACAUDAL; PERINEURAL at 07:55

## 2023-10-23 RX ADMIN — SCOPALAMINE 1 PATCH: 1 PATCH, EXTENDED RELEASE TRANSDERMAL at 07:16

## 2023-10-23 RX ADMIN — PANTOPRAZOLE SODIUM 40 MG: 40 INJECTION, POWDER, FOR SOLUTION INTRAVENOUS at 07:01

## 2023-10-23 RX ADMIN — METOCLOPRAMIDE 10 MG: 5 INJECTION, SOLUTION INTRAMUSCULAR; INTRAVENOUS at 07:01

## 2023-10-23 RX ADMIN — THIAMINE HYDROCHLORIDE 100 MG: 100 INJECTION, SOLUTION INTRAMUSCULAR; INTRAVENOUS at 09:15

## 2023-10-23 RX ADMIN — HYDROMORPHONE HYDROCHLORIDE 0.5 MG: 1 INJECTION, SOLUTION INTRAMUSCULAR; INTRAVENOUS; SUBCUTANEOUS at 09:32

## 2023-10-23 RX ADMIN — SODIUM CHLORIDE, POTASSIUM CHLORIDE, SODIUM LACTATE AND CALCIUM CHLORIDE 500 ML: 600; 310; 30; 20 INJECTION, SOLUTION INTRAVENOUS at 07:00

## 2023-10-23 RX ADMIN — ROCURONIUM BROMIDE 50 MG: 10 INJECTION, SOLUTION INTRAVENOUS at 08:04

## 2023-10-23 RX ADMIN — Medication 20 MG: at 08:04

## 2023-10-23 RX ADMIN — DEXAMETHASONE SODIUM PHOSPHATE 8 MG: 4 INJECTION, SOLUTION INTRAMUSCULAR; INTRAVENOUS at 08:03

## 2023-10-23 RX ADMIN — CEFAZOLIN 3 G: 10 INJECTION, POWDER, FOR SOLUTION INTRAVENOUS at 07:45

## 2023-10-23 RX ADMIN — MAGNESIUM SULFATE HEPTAHYDRATE 2 G: 500 INJECTION, SOLUTION INTRAMUSCULAR; INTRAVENOUS at 08:04

## 2023-10-23 RX ADMIN — PROPOFOL 150 MCG/KG/MIN: 10 INJECTION, EMULSION INTRAVENOUS at 08:00

## 2023-10-23 RX ADMIN — SUCCINYLCHOLINE CHLORIDE 180 MG: 20 INJECTION, SOLUTION INTRAMUSCULAR; INTRAVENOUS; PARENTERAL at 07:55

## 2023-10-23 RX ADMIN — ACETAMINOPHEN 1000 MG: 10 INJECTION, SOLUTION INTRAVENOUS at 08:15

## 2023-10-23 RX ADMIN — SUGAMMADEX 200 MG: 100 INJECTION, SOLUTION INTRAVENOUS at 08:47

## 2023-10-23 RX ADMIN — DROPERIDOL 0.62 MG: 2.5 INJECTION, SOLUTION INTRAMUSCULAR; INTRAVENOUS at 09:14

## 2023-10-23 NOTE — ANESTHESIA POSTPROCEDURE EVALUATION
Patient: Mishel Jimenez    Procedure Summary       Date: 10/23/23 Room / Location:  [facility]    Anesthesia Start: 0748 Anesthesia Stop: 0902    Procedure: GASTRIC BANDING LAPAROSCOPIC lysis of adhesions (Abdomen) Diagnosis:       Class 3 severe obesity due to excess calories with serious comorbidity and body mass index (BMI) of 50.0 to 59.9 in adult      (Class 3 severe obesity due to excess calories with serious comorbidity and body mass index (BMI) of 50.0 to 59.9 in adult [E66.01, Z68.43])    Surgeons: Robe Falcon Jr., MD Provider: Juancarlos Kline MD    Anesthesia Type: general ASA Status: 3            Anesthesia Type: general    Vitals  Vitals Value Taken Time   /61 10/23/23 1145   Temp 36.4 °C (97.6 °F) 10/23/23 1135   Pulse 62 10/23/23 1146   Resp 15 10/23/23 0858   SpO2 97 % 10/23/23 1146   Vitals shown include unfiled device data.        Post Anesthesia Care and Evaluation    Level of consciousness: awake and alert  Pain management: adequate    Airway patency: patent  Anesthetic complications: No anesthetic complications  PONV Status: controlled  Cardiovascular status: blood pressure returned to baseline and acceptable  Respiratory status: acceptable  Hydration status: acceptable

## 2023-10-23 NOTE — ANESTHESIA PREPROCEDURE EVALUATION
Anesthesia Evaluation     Patient summary reviewed and Nursing notes reviewed   NPO Solid Status: > 8 hours  NPO Liquid Status: > 2 hours           Airway   Mallampati: II  TM distance: >3 FB  Neck ROM: full  Possible difficult intubation  Dental    (+) upper dentures    Pulmonary - normal exam   (+) a smoker Former,  (-) pneumonia, COPD, asthma, shortness of breath, pulmonary embolism  Cardiovascular - normal exam    (+) hypertension, hyperlipidemia    ROS comment: TTE 12/2022  ·  Left ventricular systolic function is normal. Calculated left ventricular EF = 62.6% Normal left ventricular wall thickness noted. The left ventricular cavity is mildly dilated. All left ventricular wall segments contract normally. Left ventricular diastolic function was normal.  ·  Trace mitral valve regurgitation is present.  ·  Limited 2D imaging of cardiac valves that appear to function normally by Doppler assessment      Neuro/Psych  (+) weakness  (-) seizures, neuromuscular disease, TIA, CVA, dizziness/light headedness, syncope  GI/Hepatic/Renal/Endo    (+) obesity, morbid obesity, GERD well controlled    Musculoskeletal (-) negative ROS    Abdominal    Substance History - negative use     OB/GYN negative ob/gyn ROS         Other - negative ROS                     Anesthesia Plan    ASA 3     general     intravenous induction     Anesthetic plan, risks, benefits, and alternatives have been provided, discussed and informed consent has been obtained with: patient.    CODE STATUS:

## 2023-10-23 NOTE — ANESTHESIA PROCEDURE NOTES
Airway  Urgency: elective    Date/Time: 10/23/2023 7:57 AM  Airway not difficult    General Information and Staff    Patient location during procedure: OR  CRNA/CAA: Edwina Mosher CRNA    Indications and Patient Condition  Indications for airway management: airway protection    Preoxygenated: yes  Mask difficulty assessment: 0 - not attempted    Final Airway Details  Final airway type: endotracheal airway      Successful airway: ETT  Cuffed: yes   Successful intubation technique: video laryngoscopy  Facilitating devices/methods: intubating stylet  Endotracheal tube insertion site: oral  Blade: CMAC  Blade size: D  ETT size (mm): 7.0  Cormack-Lehane Classification: grade IIa - partial view of glottis  Placement verified by: chest auscultation and capnometry   Measured from: lips  ETT/EBT  to lips (cm): 22  Number of attempts at approach: 1  Assessment: lips, teeth, and gum same as pre-op and atraumatic intubation

## 2023-10-23 NOTE — OP NOTE
PREOPERATIVE DIAGNOSIS:  Morbid obesity with multiple comorbidities as referenced in the most recent history and physical.    POSTOPERATIVE DIAGNOSIS:  Morbid obesity with multiple comorbidities as referenced in the most recent history and physical. Adhesions mid abdomen    PROCEDURE PERFORMED: Laparoscopic AP large LapBand®  placement. 2. Lysis adhesions    SURGEON:  Robe Falcon Jr., MD    ASSISTANT:  LILLIAN Luna, [facility]    Surgery assisted and facilitated by a certified physician assistant, who directly resulted in a decreased operative time, anesthetic time, wound exposure, and possibly of an operative wound infection, thereby decreasing patient morbidity and ultimately total expenditures.  The surgical assistant assisted in preparing the adjustable gastric band in preparation for the surgery.  They also assisted in dissection of the angle of Hiss and the pars flaccida as well as insertion of the adjustable gastric band into the abdomen and around the stomach for placement.  They also assisted in placement of the adjustable gastric band port as well as closing the incisions.    ANESTHESIA:  General endotracheal.    ESTIMATED BLOOD LOSS:  Less than 10 mL unless dictated below    FLUIDS:  Crystalloid.    SPECIMENS:  None.    DRAINS:  None.    COUNTS:  Correct.    COMPLICATIONS:  None.    INDICATIONS:  This patient presented for elective laparoscopic LapBand® placement.  The patient had undergone our extensive preoperative evaluation, teaching and consent process.    DESCRIPTION OF PROCEDURE:  The patient was brought to the operating room and placed in the supine position on the operating room table.  The patient had already received intravenous antibiotics and subcutaneous Lovenox and had SCD hose in place.  The patient underwent uneventful general endotracheal anesthesia per the anesthesiology staff and the abdomen was prepped with ChloraPrep and draped in the usual sterile fashion. An Ioban was  used as well if not allergic.      A 5 mm transverse incision was made to the left of the midline in the upper abdomen.  The peritoneal cavity was entered under direct camera visualization with a 5 mm 0° laparoscope and an Optiview trocar.  The abdomen was insufflated to a pressure of 16 mmHg with CO2 gas. An angled 5 mm laparoscope was then used.  Exploratory laparoscopy was carried out and no injury was noted upon entry with the trocar.  No significant abnormalities were noted in the upper abdomen unless addendum dictated.  Under direct camera visualization, a 10 mm trocar and a 5 mm trocar were placed in the left mid abdominal position of the abdomen.  A 5 mm trocar was placed in the right lateral subcostal position.  Prior to placing the 10 mm left midabdominal trocar, the skin and deeper tissues were infiltrated with local anesthetic.  A Prosper retractor was placed through an epigastric incision and used to elevate the left lobe of the liver.  The left jose antonio was cleaned off using blunt dissection and cautery.  The gastric fat pad was dissected free using blunt dissection and cautery.  The pars flaccida was incised with cautery and the base of the right jose antonio identified. Using blunt dissection, a window was created across the right and left crura from the base of the right jose antonio to the lateral aspect of the left jose natonio medial to the spleen.  The grasper was placed through this tunnel, the appropriately flushed AP large LapBand® was placed into the peritoneal cavity and the tubing was grasped with the grasper.  The grasper and tubing were brought back through the posterior gastric tunnel, once again anterior to the crura.  The tubing was passed through the Band and the shoulder of the Band was cleared from its posterior attachments. The Anesthesiology Staff passed the calibration tubing into the gastric cavity and the balloon was insufflated to 15 mL and withdrawn to the GE junction to size the gastric pouch and  determine band placement. Excess fat from the lesser omentum was divided as necessary with cautery.  The AP large LapBand® was then closed in the appropriate fashion over the calibration tubing just below the balloon.  The band was then fixed anteriorly with several interrupted 2-0 silk seromuscular sutures from below the band to the small gastric pouch above the band leaving the buckle exposed.  The calibration balloon was deflated and removed during the process of placing the first and most lateral anterior 2-0 silk seromuscular stitch.  The tubing was brought out through the left midabdominal trocar site.  All trocars and the Prosper retractor were removed   under direct camera visualization and no bleeding was noted from their sites. The abdomen was desufflated of gas.  A subcutaneous pocket was created inferior to the incision above the anterior rectus fascia in the left midabdominal incision site.  The catheter tubing was cut and connected to the flushed port. The port was then tacked in a four-point fashion to the anterior rectus sheath with 4 interrupted 0-0 Ethibond sutures. Excess tubing was placed into the peritoneal cavity such that the tubing connection resided within the peritoneal cavity.  The port site area was copiously irrigated with sterile saline and meticulous hemostasis obtained. All incisions were then infiltrated with local anesthetic. The subcutaneous tissue at the port site was closed using an interrupted 2-0 Vicryl plus antimicrobial suture, and then skin in each incision was closed using 4-0 Monocryl plus antimicrobial in a subcuticular stitch followed by Dermabond or SureClose. The port-site incision was covered with a Tegaderm.  All sponge, needle, and instrument counts were correct.    The patient tolerated the procedure well without complication and was taken to the recovery room in stable condition.    The hiatus was checked for a hernia with the calibration balloon inflated to 15  mL of air and no hernia was detected.    Adhesions mid abdomen partially taken down with harmonic scalpel.

## 2023-10-26 ENCOUNTER — OFFICE VISIT (OUTPATIENT)
Dept: BARIATRICS/WEIGHT MGMT | Facility: CLINIC | Age: 63
End: 2023-10-26
Payer: COMMERCIAL

## 2023-10-26 VITALS
DIASTOLIC BLOOD PRESSURE: 77 MMHG | WEIGHT: 293 LBS | BODY MASS INDEX: 51.91 KG/M2 | TEMPERATURE: 97.3 F | SYSTOLIC BLOOD PRESSURE: 131 MMHG | HEIGHT: 63 IN | HEART RATE: 79 BPM

## 2023-10-26 DIAGNOSIS — Z71.3 DIETARY COUNSELING: ICD-10-CM

## 2023-10-26 DIAGNOSIS — E66.01 CLASS 3 SEVERE OBESITY DUE TO EXCESS CALORIES WITH SERIOUS COMORBIDITY AND BODY MASS INDEX (BMI) OF 50.0 TO 59.9 IN ADULT: Primary | ICD-10-CM

## 2023-10-26 DIAGNOSIS — Z98.84 H/O LAPAROSCOPIC ADJUSTABLE GASTRIC BANDING: ICD-10-CM

## 2023-10-26 PROCEDURE — 99024 POSTOP FOLLOW-UP VISIT: CPT | Performed by: NURSE PRACTITIONER

## 2023-10-26 NOTE — PROGRESS NOTES
[facility]ATR[facility] BARIATRIC SURGERY  [address] [facility] [address]  [place] [address]  [phone]  [address] [facility] [address]  [place] [address]  [phone]  Dept: [phone]  10/26/2023      Mishel Jimenez.  [id number]  [id number]  1960  female      Chief Complaint   Patient presents with    Post-op     3 days post op AGB placement        BH Post-Op Bariatric Surgery:   Mishel Jimenez is status post laparopscopic Laparoscopic Band procedure, performed on 10/23/2033.     HPI:   Today's weight is 135 kg (298 lb) pounds, today's BMI is Body mass index is 52.8 kg/m².,HE@ has a  loss of 10 pounds since surgery. The patient reports a decreased portion size and loss of appetite.  Mishel Jimenez denies nausea, vomiting, reflux, dysphagia and reports tolerating clears. The patient c/o appropriate post-op incisional discomfort that is improving. she is doing well with protein and water intake so far. Taking their vitamins, walking and using IS. Denies fevers, chills, chest pain or shortness of air.   Struggling with liquids.  Getting 32 oz of water and 30 grams of protein.  Feels like if increases intake, will get sick.    Not taking bmtv as making her nauseated.       Diet and Exercise: Diet history reviewed and discussed with the patient. Weight loss/gains to date discussed with the patient. No carbonated beverage consumption and exercising regularly- walking frequently.   Supplements: multivitamins, B-12, calcium, iron, B-1 and Vitamin D.   Patient is taking blood thinner as prescribed: Lovenox  Current outpatient and discharge medications have been reconciled for the patient.  Reviewed by: Sophia Carpenter, TIFFANY        Review of Systems   Constitutional:  Positive for activity change.   Respiratory:  Negative for shortness of breath.    Cardiovascular:  Negative for chest pain.   Gastrointestinal:  Positive for abdominal pain.   All other systems reviewed and are  negative.      Patient Active Problem List   Diagnosis    Class 3 severe obesity with body mass index (BMI) of 50.0 to 59.9 in adult    Essential hypertension    Ankle edema    Intermittent claudication    Shortness of breath    Snoring    Chronic back pain    Chronic knee pain    Weakness    PAC (premature atrial contraction)    Hyperlipidemia    Dietary counseling    H/O laparoscopic adjustable gastric banding       The following portions of the patient's history were reviewed and updated as appropriate: allergies, current medications, past medical history, past surgical history, and problem list.    Vitals:    10/26/23 1102   BP: 131/77   Pulse: 79   Temp: 97.3 °F (36.3 °C)       Physical Exam  Vitals reviewed.   Constitutional:       General: She is awake. She is not in acute distress.     Appearance: She is morbidly obese.   HENT:      Head: Normocephalic and atraumatic.      Mouth/Throat:      Mouth: Mucous membranes are moist.   Eyes:      General: No scleral icterus.     Extraocular Movements: Extraocular movements intact.      Conjunctiva/sclera: Conjunctivae normal.      Pupils: Pupils are equal, round, and reactive to light.   Cardiovascular:      Rate and Rhythm: Normal rate and regular rhythm.   Pulmonary:      Effort: Pulmonary effort is normal. No respiratory distress.   Abdominal:      General: Abdomen is flat. Bowel sounds are normal. There is no distension.      Palpations: Abdomen is soft.      Tenderness: There is no abdominal tenderness. There is no guarding.      Comments: Incisions clean, dry, intact; no erythema   Musculoskeletal:         General: Normal range of motion.      Cervical back: Normal range of motion and neck supple.   Skin:     General: Skin is warm and dry.   Neurological:      General: No focal deficit present.      Mental Status: She is alert and oriented to person, place, and time.   Psychiatric:         Mood and Affect: Mood normal.         Behavior: Behavior normal.  Behavior is cooperative.         Thought Content: Thought content normal.         Judgment: Judgment normal.         Assessment:   Post-op, the patient is is doing well.     Encounter Diagnoses   Name Primary?    Class 3 severe obesity due to excess calories with serious comorbidity and body mass index (BMI) of 50.0 to 59.9 in adult Yes    H/O laparoscopic adjustable gastric banding     Dietary counseling        Plan:   Reviewed with patient the importance of following the manual for diet progression. Increase activity as tolerated. Continue increasing daily intake of protein and water.   Return to work: the patient is to return to 3 weeks from their surgery date with no restrictions unless they develop medical problems in which we will see them back in the office. They received a note in our office today with their return to work date.  Activity restrictions: no lifting, pushing or pulling over 25lbs for 3 weeks.   Recommended patient be sure to get at least 70 grams of protein per day. Discussed with the patient the recommended amount of water per day to intake. Reviewed vitamin requirements. Be sure to do routine exercise and increase activity as tolerated. No asa, nsaids or steroids for 8 weeks if gastric sleeve procedure and lifelong if gastric bypass procedure.. Patient may use miralax as needed if necessary.     Instructions / Recommendations: dietary counseling recommended, recommended a daily protein intake of  grams, vitamin supplement(s) recommended, recommended exercising at least 150 minutes per week, behavior modifications recommended and instructed to call the office for concerns, questions, or problems.     The patient was instructed to follow up at one month follow up appt.     The patient was counseled regarding post op bariatric manual

## 2023-12-01 ENCOUNTER — OFFICE VISIT (OUTPATIENT)
Dept: BARIATRICS/WEIGHT MGMT | Facility: CLINIC | Age: 63
End: 2023-12-01
Payer: COMMERCIAL

## 2023-12-01 VITALS
HEIGHT: 63 IN | WEIGHT: 288 LBS | HEART RATE: 76 BPM | TEMPERATURE: 97.5 F | BODY MASS INDEX: 51.03 KG/M2 | DIASTOLIC BLOOD PRESSURE: 84 MMHG | SYSTOLIC BLOOD PRESSURE: 150 MMHG

## 2023-12-01 DIAGNOSIS — E66.01 CLASS 3 SEVERE OBESITY DUE TO EXCESS CALORIES WITH SERIOUS COMORBIDITY AND BODY MASS INDEX (BMI) OF 50.0 TO 59.9 IN ADULT: Primary | ICD-10-CM

## 2023-12-01 DIAGNOSIS — Z98.84 H/O LAPAROSCOPIC ADJUSTABLE GASTRIC BANDING: ICD-10-CM

## 2023-12-01 DIAGNOSIS — Z71.3 DIETARY COUNSELING: ICD-10-CM

## 2023-12-01 PROCEDURE — 99024 POSTOP FOLLOW-UP VISIT: CPT | Performed by: NURSE PRACTITIONER

## 2023-12-01 NOTE — PROGRESS NOTES
[facility]ATR[facility] BARIATRIC SURGERY  [address] [facility] [address]  [facility] [address]  [phone]  [address] [facility] [address]  [facility] [id number] [phone]  Dept: [phone]  12/1/2023      Mishel Jimenez.  [id number]  [id number]  1960  female      Chief Complaint   Patient presents with    Post-op     1 mo PO band       BH Post-Op Bariatric Surgery:   Mishel Jimenez is status post Lapband procedure APL, performed on 10/23/2023.     HPI:   Today's weight is 131 kg (288 lb)  pounds, today's BMI is Body mass index is 51.03 kg/m². andHE@ has a loss of 10 pounds since the last visit. The patient reports a portion size larger than the small plate, increased hunger and denies a loss of appetite.     Mishel Jimenez denies nausea, dysphagia, or abdominal pain. The patientdoes not have vomitng. The patientdoes not have reflux.  States she struggles a little to eat first thing in the morning.  She has multigrain bread toasted with PB.  She eats lean meat with veggie for lunch and dinner.  She snacks on nuts or cheese.  Had some issues with nausea with bmtv but has changed to taking at night and tolerates much better.  She doesn't like or tolerate protein shakes.      Diet and Exercise: Diet history reviewed and discussed with the patient. Weight loss/gains to date discussed with the patient. She reports eating 3 meals per day, a typical portion size of greater than 1 cup, eating 1-2 snack per day, drinking 5 8-oz. glasses of water per day. The patient can tolerate solid protein.   The patient is eating protein first. The patient is limiting food volume. The patient is taking vitamins. The patient is limiting snacking. The patient is not exercising regularly. She is not drinking carbonated beverages.     The following portions of the patient's history were reviewed and updated as appropriate: allergies, current medications, past medical history, past surgical history, and problem  list.    Vitals:    12/01/23 1358   BP: 150/84   Pulse: 76   Temp: 97.5 °F (36.4 °C)       Review of Systems   Constitutional:  Positive for activity change and appetite change.   Respiratory:  Negative for shortness of breath.    Cardiovascular:  Negative for chest pain.   All other systems reviewed and are negative.      Physical Exam  Vitals reviewed.   Constitutional:       General: She is not in acute distress.     Appearance: Normal appearance. She is morbidly obese.   HENT:      Head: Normocephalic and atraumatic.      Mouth/Throat:      Mouth: Mucous membranes are moist.      Pharynx: Oropharynx is clear.   Eyes:      General: No scleral icterus.     Extraocular Movements: Extraocular movements intact.      Conjunctiva/sclera: Conjunctivae normal.      Pupils: Pupils are equal, round, and reactive to light.   Cardiovascular:      Rate and Rhythm: Normal rate and regular rhythm.   Pulmonary:      Effort: Pulmonary effort is normal. No respiratory distress.   Abdominal:      General: Bowel sounds are normal.      Palpations: Abdomen is soft.   Musculoskeletal:         General: Normal range of motion.      Cervical back: Normal range of motion and neck supple.   Skin:     General: Skin is warm and dry.   Neurological:      General: No focal deficit present.      Mental Status: She is alert and oriented to person, place, and time.   Psychiatric:         Mood and Affect: Mood normal.         Behavior: Behavior normal.         Thought Content: Thought content normal.         Judgment: Judgment normal.         Assessment: Post-operatively the patient is doing well    Encounter Diagnoses   Name Primary?    Class 3 severe obesity due to excess calories with serious comorbidity and body mass index (BMI) of 50.0 to 59.9 in adult Yes    H/O laparoscopic adjustable gastric banding     Dietary counseling        Plan:     I think she would benefit from additional band restriction.  Under aseptic conditions, I accessed the  port and 3mls of fluid were added for a total of 3mls.  She was able to tolerate a glass of water at the conclusion of the fill.  She was advised to consume soft solids for 24 hours before advancing back to a regular diet.  RTC for n/v/d/regurg.     We discussed her protein sources and that eating dense solid protein along with plenty of vegetables and fresh fruits is important for weight loss. Reviewed appropriate water intake- half of body weight in ounces and exercise routine- minimum of 150 minutes per with including both cardio and strength training. Instructed not to drink with meals and wait 45 minutes after each meal before drinking.    She should follow-up in 4 weeks     Activity restrictions: None.   Instructions / Recommendations: dietary counseling recommended, recommended a daily protein intake of  grams, patient was advised that the lap band system works best when consuming solid foods, vitamin supplement(s) recommended, recommended exercising at least 150 minutes per week, behavior modifications recommended and instructed to call the office for concerns, questions, or problems.    Total time spent with the patient was approximately 25 minutes.  Chart was also reviewed prior to starting the visit. The patient was counseled regarding the LAP-BAND and how the band works.

## 2023-12-29 ENCOUNTER — OFFICE VISIT (OUTPATIENT)
Dept: BARIATRICS/WEIGHT MGMT | Facility: CLINIC | Age: 63
End: 2023-12-29
Payer: COMMERCIAL

## 2023-12-29 VITALS
SYSTOLIC BLOOD PRESSURE: 132 MMHG | TEMPERATURE: 97.3 F | HEART RATE: 71 BPM | BODY MASS INDEX: 51.83 KG/M2 | WEIGHT: 292.5 LBS | OXYGEN SATURATION: 98 % | HEIGHT: 63 IN | DIASTOLIC BLOOD PRESSURE: 80 MMHG

## 2023-12-29 DIAGNOSIS — M54.9 CHRONIC BACK PAIN, UNSPECIFIED BACK LOCATION, UNSPECIFIED BACK PAIN LATERALITY: ICD-10-CM

## 2023-12-29 DIAGNOSIS — E66.01 CLASS 3 SEVERE OBESITY DUE TO EXCESS CALORIES WITH SERIOUS COMORBIDITY AND BODY MASS INDEX (BMI) OF 50.0 TO 59.9 IN ADULT: Primary | ICD-10-CM

## 2023-12-29 DIAGNOSIS — G89.29 CHRONIC BACK PAIN, UNSPECIFIED BACK LOCATION, UNSPECIFIED BACK PAIN LATERALITY: ICD-10-CM

## 2023-12-29 DIAGNOSIS — Z98.84 H/O LAPAROSCOPIC ADJUSTABLE GASTRIC BANDING: ICD-10-CM

## 2023-12-29 DIAGNOSIS — Z71.3 DIETARY COUNSELING: ICD-10-CM

## 2023-12-29 PROCEDURE — 99024 POSTOP FOLLOW-UP VISIT: CPT | Performed by: NURSE PRACTITIONER

## 2024-01-02 RX ORDER — PREDNISONE 20 MG/1
20 TABLET ORAL DAILY
COMMUNITY
Start: 2023-12-26

## 2024-01-02 RX ORDER — CYCLOBENZAPRINE HCL 10 MG
10 TABLET ORAL 2 TIMES DAILY PRN
COMMUNITY
Start: 2023-12-22 | End: 2024-01-02

## 2024-01-02 RX ORDER — HYDROCODONE BITARTRATE AND ACETAMINOPHEN 10; 325 MG/1; MG/1
1 TABLET ORAL 3 TIMES DAILY PRN
COMMUNITY
Start: 2023-12-26

## 2024-01-02 RX ORDER — MELOXICAM 15 MG/1
15 TABLET ORAL DAILY
COMMUNITY
Start: 2023-12-11

## 2024-01-02 NOTE — PROGRESS NOTES
[facility]ATR[facility] BARIATRIC SURGERY  [address] [facility] [address]  [facility] [address]  [phone]  [address] [facility] [address]  [facility] [id number] [phone]  Dept: [phone]  1/2/2024      Mishel Jimenez.  [id number]  [id number]  1960  female      Chief Complaint   Patient presents with    Follow-up     2 month follow up band       BH Post-Op Bariatric Surgery:   Mishel Jimenez is status post Lapband procedure APL, performed on 10/23/2023.     HPI:   Today's weight is 133 kg (292 lb 8 oz)  pounds, today's BMI is Body mass index is 51.83 kg/m². and has a gain of 4 pounds since the last visit. The patient reports a portion size larger than the small plate, increased hunger and denies a loss of appetite.     Mishel Jimenez denies nausea, dysphagia, or abdominal pain. The patientdoes not have vomitng. The patientdoes not have reflux.  C/o sciatic nerve pain which started right before tawanda.  Has been on Prednisone 20 mg daily.  States can barely walk.  Feels pain down to feet.  Last fill of band seemed to help/feel restriction for about 7-10 days then c/o constant hunger.  Last week felt like she ate anything and everything.  Had sweets in house and was grabbing some every time she walked through kitchen.  Is not sleeping well due to pain.  Has not been drinking protein shakes as doesn't like the texture.  States gives her nausea and vomiting.  Trying to eat foods that are high in protien.      Diet and Exercise: Diet history reviewed and discussed with the patient. Weight loss/gains to date discussed with the patient. She reports eating 3 meals per day, a typical portion size of greater than 1 cup, eating 4-5 snack per day, drinking 5 8-oz. glasses of water per day. The patient can tolerate solid protein.   The patient is not eating protein first. The patient is not limiting food volume. The patient is taking vitamins. The patient is not limiting snacking. The patient is  exercising regularly. She is not drinking carbonated beverages.     The following portions of the patient's history were reviewed and updated as appropriate: allergies, current medications, past medical history, past surgical history, and problem list.    Vitals:    12/29/23 1152   BP: 132/80   Pulse: 71   Temp: 97.3 °F (36.3 °C)   SpO2: 98%       Review of Systems   Constitutional:  Positive for activity change, appetite change, fatigue and unexpected weight change.   Respiratory:  Negative for shortness of breath.    Cardiovascular:  Negative for chest pain.   Musculoskeletal:  Positive for arthralgias, back pain and myalgias.   All other systems reviewed and are negative.      Physical Exam  Vitals reviewed.   Constitutional:       General: She is not in acute distress.     Appearance: Normal appearance. She is morbidly obese.   HENT:      Head: Normocephalic and atraumatic.      Mouth/Throat:      Mouth: Mucous membranes are moist.      Pharynx: Oropharynx is clear.   Eyes:      General: No scleral icterus.     Extraocular Movements: Extraocular movements intact.      Conjunctiva/sclera: Conjunctivae normal.      Pupils: Pupils are equal, round, and reactive to light.   Cardiovascular:      Rate and Rhythm: Normal rate and regular rhythm.   Pulmonary:      Effort: Pulmonary effort is normal. No respiratory distress.   Abdominal:      General: Bowel sounds are normal.      Palpations: Abdomen is soft.   Musculoskeletal:         General: Normal range of motion.      Cervical back: Normal range of motion and neck supple.   Skin:     General: Skin is warm and dry.   Neurological:      General: No focal deficit present.      Mental Status: She is alert and oriented to person, place, and time.   Psychiatric:         Mood and Affect: Mood normal.         Behavior: Behavior normal.         Thought Content: Thought content normal.         Judgment: Judgment normal.         Assessment: Post-operatively the patient is  struggling with back pain/sciatica and increased hunger with steroid use.    Encounter Diagnoses   Name Primary?    Class 3 severe obesity due to excess calories with serious comorbidity and body mass index (BMI) of 50.0 to 59.9 in adult Yes    H/O laparoscopic adjustable gastric banding     Chronic back pain, unspecified back location, unspecified back pain laterality     Dietary counseling        Plan:     I think she would benefit from additional band restriction.  Under aseptic conditions, I accessed the port and 1.5mls of fluid were added for a total of 4.5mls.  She was able to tolerate a glass of water at the conclusion of the fill.  She was advised to consume soft solids for 24 hours before advancing back to a regular diet.  RTC for n/v/d/regurg.     We discussed her protein sources and that eating dense solid protein along with plenty of vegetables and fresh fruits is important for weight loss. Reviewed appropriate water intake- half of body weight in ounces and exercise routine- minimum of 150 minutes per with including both cardio and strength training. Instructed not to drink with meals and wait 45 minutes after each meal before drinking.    She should follow-up in 4 weeks or prn     Activity restrictions: None.   Instructions / Recommendations: dietary counseling recommended, recommended a daily protein intake of  grams, patient was advised that the lap band system works best when consuming solid foods, vitamin supplement(s) recommended, recommended exercising at least 150 minutes per week, behavior modifications recommended and instructed to call the office for concerns, questions, or problems.    Total time spent with the patient was approximately 25 minutes.  Chart was also reviewed prior to starting the visit. The patient was counseled regarding the LAP-BAND and how the band works.

## 2024-01-30 ENCOUNTER — OFFICE VISIT (OUTPATIENT)
Dept: BARIATRICS/WEIGHT MGMT | Facility: CLINIC | Age: 64
End: 2024-01-30
Payer: COMMERCIAL

## 2024-01-30 VITALS
TEMPERATURE: 97.5 F | HEIGHT: 63 IN | SYSTOLIC BLOOD PRESSURE: 140 MMHG | BODY MASS INDEX: 50.85 KG/M2 | WEIGHT: 287 LBS | HEART RATE: 76 BPM | DIASTOLIC BLOOD PRESSURE: 88 MMHG

## 2024-01-30 DIAGNOSIS — E11.9 TYPE 2 DIABETES MELLITUS WITHOUT COMPLICATION, WITHOUT LONG-TERM CURRENT USE OF INSULIN: ICD-10-CM

## 2024-01-30 DIAGNOSIS — Z71.3 DIETARY COUNSELING: ICD-10-CM

## 2024-01-30 DIAGNOSIS — E66.01 CLASS 3 SEVERE OBESITY DUE TO EXCESS CALORIES WITH SERIOUS COMORBIDITY AND BODY MASS INDEX (BMI) OF 50.0 TO 59.9 IN ADULT: Primary | ICD-10-CM

## 2024-01-30 DIAGNOSIS — Z98.84 H/O LAPAROSCOPIC ADJUSTABLE GASTRIC BANDING: ICD-10-CM

## 2024-01-30 RX ORDER — SEMAGLUTIDE 1.34 MG/ML
INJECTION, SOLUTION SUBCUTANEOUS
Qty: 1.5 ML | Refills: 0 | Status: SHIPPED | OUTPATIENT
Start: 2024-01-30 | End: 2024-03-26

## 2024-01-30 NOTE — PROGRESS NOTES
[facility] BARIATRIC [facility] BARIATRIC SURGERY  [address] [facility] [address]  [facility] [address]  [phone]  [address] [address]  [phone]  Dept: [phone]  1/30/2024      Mishel Jimenez.  [id number]  [id number]  1960  female      Chief Complaint   Patient presents with    Follow-up     3 mo fup band       BH Post-Op Bariatric Surgery:   Mishel Jimenez is status post Lapband procedure APL, performed on 10/23/2023 with 4.5 cc.     HPI:   Today's weight is 130 kg (287 lb)  pounds, today's BMI is Body mass index is 50.85 kg/m². andHE@ has a loss of 5 pounds since the last visit. The patient reports a portion size larger than the small plate, increased hunger and denies a loss of appetite.     Mishel Jimenez denies nausea, dysphagia, or abdominal pain. The patientdoes not have vomitng. The patientdoes not have reflux.    Diet and Exercise: Diet history reviewed and discussed with the patient. Weight loss/gains to date discussed with the patient. She reports eating 3 meals per day, a typical portion size of greater than 1 cup, eating 3-4 snack per day, drinking 5+ 8-oz. glasses of water per day. The patient can tolerate solid protein.   The patient is eating protein first. The patient is limiting food volume. The patient is taking vitamins. The patient is not limiting snacking. The patient is exercising regularly. She is not drinking carbonated beverages.     The following portions of the patient's history were reviewed and updated as appropriate: allergies, current medications, past medical history, past surgical history, and problem list.    Vitals:    01/30/24 1058   BP: 140/88   Pulse: 76   Temp: 97.5 °F (36.4 °C)       Review of Systems   Constitutional:  Positive for activity change and appetite change.   Respiratory:  Negative for shortness of breath.    Cardiovascular:  Negative for chest pain.   All other systems reviewed and are  negative.      Physical Exam  Vitals reviewed.   Constitutional:       General: She is not in acute distress.     Appearance: Normal appearance. She is morbidly obese.   HENT:      Head: Normocephalic and atraumatic.      Mouth/Throat:      Mouth: Mucous membranes are moist.      Pharynx: Oropharynx is clear.   Eyes:      General: No scleral icterus.     Extraocular Movements: Extraocular movements intact.      Conjunctiva/sclera: Conjunctivae normal.      Pupils: Pupils are equal, round, and reactive to light.   Cardiovascular:      Rate and Rhythm: Normal rate and regular rhythm.   Pulmonary:      Effort: Pulmonary effort is normal. No respiratory distress.   Abdominal:      General: Bowel sounds are normal.      Palpations: Abdomen is soft.   Musculoskeletal:         General: Normal range of motion.      Cervical back: Normal range of motion and neck supple.   Skin:     General: Skin is warm and dry.   Neurological:      General: No focal deficit present.      Mental Status: She is alert and oriented to person, place, and time.   Psychiatric:         Mood and Affect: Mood normal.         Behavior: Behavior normal.         Thought Content: Thought content normal.         Judgment: Judgment normal.         Assessment: Post-operatively the patient is struggling with hunger    Encounter Diagnoses   Name Primary?    Class 3 severe obesity due to excess calories with serious comorbidity and body mass index (BMI) of 50.0 to 59.9 in adult Yes    H/O laparoscopic adjustable gastric banding     Type 2 diabetes mellitus without complication, without long-term current use of insulin     Dietary counseling        Plan:     I think she would benefit from additional band restriction.  Under aseptic conditions, I accessed the port and 1mls of fluid were added for a total of 5.5mls.  She was able to tolerate a glass of water at the conclusion of the fill.  She was advised to consume soft solids for 24 hours before advancing back  to a regular diet.  RTC for n/v/d/regurg.   We also discussed GLP1 therapy.  She is not currently on any medication for her diabetes.  Her A1c prior to surgery was 7.7.  Will treat with Ozempic.  Reviewed side effects.  Demonstrated administration with demo pen in office.      We discussed her protein sources and that eating dense solid protein along with plenty of vegetables and fresh fruits is important for weight loss. Reviewed appropriate water intake- half of body weight in ounces and exercise routine- minimum of 150 minutes per with including both cardio and strength training. Instructed not to drink with meals and wait 45 minutes after each meal before drinking.    She should follow-up in 4 weeks     Activity restrictions: None.   Instructions / Recommendations: dietary counseling recommended, recommended a daily protein intake of  grams, patient was advised that the lap band system works best when consuming solid foods, vitamin supplement(s) recommended, recommended exercising at least 150 minutes per week, behavior modifications recommended and instructed to call the office for concerns, questions, or problems.    Total time spent with the patient was approximately 25 minutes.  Chart was also reviewed prior to starting the visit. The patient was counseled regarding the LAP-BAND and how the band works.

## 2024-03-04 ENCOUNTER — OFFICE VISIT (OUTPATIENT)
Dept: BARIATRICS/WEIGHT MGMT | Facility: CLINIC | Age: 64
End: 2024-03-04
Payer: COMMERCIAL

## 2024-03-04 VITALS
HEART RATE: 90 BPM | SYSTOLIC BLOOD PRESSURE: 142 MMHG | TEMPERATURE: 97.5 F | WEIGHT: 281.4 LBS | BODY MASS INDEX: 49.86 KG/M2 | HEIGHT: 63 IN | DIASTOLIC BLOOD PRESSURE: 84 MMHG

## 2024-03-04 DIAGNOSIS — E11.9 TYPE 2 DIABETES MELLITUS WITHOUT COMPLICATION, WITHOUT LONG-TERM CURRENT USE OF INSULIN: ICD-10-CM

## 2024-03-04 DIAGNOSIS — E66.01 MORBID OBESITY WITH BMI OF 45.0-49.9, ADULT: Primary | ICD-10-CM

## 2024-03-04 DIAGNOSIS — Z71.3 DIETARY COUNSELING: ICD-10-CM

## 2024-03-04 DIAGNOSIS — Z98.84 H/O LAPAROSCOPIC ADJUSTABLE GASTRIC BANDING: ICD-10-CM

## 2024-03-04 PROCEDURE — 99213 OFFICE O/P EST LOW 20 MIN: CPT | Performed by: NURSE PRACTITIONER

## 2024-03-04 RX ORDER — SEMAGLUTIDE 1.34 MG/ML
1 INJECTION, SOLUTION SUBCUTANEOUS WEEKLY
Qty: 3 ML | Refills: 1 | Status: SHIPPED | OUTPATIENT
Start: 2024-03-25

## 2024-03-04 NOTE — PROGRESS NOTES
[facility] BARIATRIC [facility] BARIATRIC SURGERY  [address]  [facility] [id number] [phone]  [address]  [facility] [place] [facility] [id number] [phone]  Dept: [phone]  3/4/2024      Mishel Jimenez.  [id number]  [id number]  1960  female      Chief Complaint   Patient presents with    Follow-up     4 month follow up band       BH Post-Op Bariatric Surgery:   Mishel Jimenez is status post Lapband procedure APL, performed on 10/23/2023 with 5.5 ml.  Was started on Ozempic for treatment of DM on 1/30/24 @ 287#.     HPI:   Today's weight is 128 kg (281 lb 6.4 oz)  pounds, today's BMI is Body mass index is 49.86 kg/m². andHE@ has a loss of 6 pounds since the last visit. The patient reports a portion size larger than the small plate, increased hunger and denies a loss of appetite.     Mishel Jimenez denies nausea, dysphagia, or abdominal pain. The patientdoes not have vomitng. The patientdoes not have reflux.  Feels like eating less at meals but is hungry between meals.  When on the road is on hotel room.  Is hungry 30-60 minutes after meals.  Purchased fruits, protein bars to help in between when on the road.  Eats prior to going to hotel room; chicken.  Sometimes when eats pork or beef has issues with it sitting well.  Feels like shouldn't be hungry as often as she is.  Protein shakes make her vomit.  Has tried 5-6 different kinds.    Friday had steroid injection on her back.      Diet and Exercise: Diet history reviewed and discussed with the patient. Weight loss/gains to date discussed with the patient. She reports eating 3 meals per day, a typical portion size of greater than 1 cup, eating 3 snack per day, drinking 5+ 8-oz. glasses of water per day. The patient can tolerate solid protein.   The patient is eating protein first. The patient is limiting food volume. The patient is taking vitamins. The patient is limiting snacking. The patient is exercising  regularly. She is drinking carbonated beverages.     The following portions of the patient's history were reviewed and updated as appropriate: allergies, current medications, past medical history, past surgical history, and problem list.    Vitals:    03/04/24 1101   BP: 142/84   Pulse: 90   Temp: 97.5 °F (36.4 °C)       Review of Systems   Constitutional:  Positive for activity change and appetite change.   Respiratory:  Negative for shortness of breath.    Cardiovascular:  Negative for chest pain.   All other systems reviewed and are negative.      Physical Exam  Vitals reviewed.   Constitutional:       General: She is not in acute distress.     Appearance: Normal appearance. She is morbidly obese.   HENT:      Head: Normocephalic and atraumatic.      Mouth/Throat:      Mouth: Mucous membranes are moist.      Pharynx: Oropharynx is clear.   Eyes:      General: No scleral icterus.     Extraocular Movements: Extraocular movements intact.      Conjunctiva/sclera: Conjunctivae normal.      Pupils: Pupils are equal, round, and reactive to light.   Cardiovascular:      Rate and Rhythm: Normal rate and regular rhythm.   Pulmonary:      Effort: Pulmonary effort is normal. No respiratory distress.   Abdominal:      General: Bowel sounds are normal.      Palpations: Abdomen is soft.   Musculoskeletal:         General: Normal range of motion.      Cervical back: Normal range of motion and neck supple.   Skin:     General: Skin is warm and dry.   Neurological:      General: No focal deficit present.      Mental Status: She is alert and oriented to person, place, and time.   Psychiatric:         Mood and Affect: Mood normal.         Behavior: Behavior normal.         Thought Content: Thought content normal.         Judgment: Judgment normal.         Assessment: Post-operatively the patient is doing well    Encounter Diagnoses   Name Primary?    Morbid obesity with BMI of 45.0-49.9, adult Yes    H/O laparoscopic adjustable  gastric banding     Type 2 diabetes mellitus without complication, without long-term current use of insulin     Dietary counseling        Plan:     I think she would benefit from additional band restriction.  Under aseptic conditions, I accessed the port and 1mls of fluid were added for a total of 6.5mls.  She was able to tolerate a glass of water at the conclusion of the fill.  She was advised to consume soft solids for 24 hours before advancing back to a regular diet.  RTC for n/v/d/regurg.     We discussed her protein sources and that eating dense solid protein along with plenty of vegetables and fresh fruits is important for weight loss. Reviewed appropriate water intake- half of body weight in ounces and exercise routine- minimum of 150 minutes per with including both cardio and strength training. Instructed not to drink with meals and wait 45 minutes after each meal before drinking.    She should follow-up in 4 weeks for adjustment or 3 months for medication follow up      Activity restrictions: None.   Instructions / Recommendations: dietary counseling recommended, recommended a daily protein intake of  grams, patient was advised that the lap band system works best when consuming solid foods, vitamin supplement(s) recommended, recommended exercising at least 150 minutes per week, behavior modifications recommended and instructed to call the office for concerns, questions, or problems.    Total time spent with the patient was approximately 25 minutes.  Chart was also reviewed prior to starting the visit. The patient was counseled regarding the LAP-BAND and how the band works.

## 2024-04-29 ENCOUNTER — OFFICE VISIT (OUTPATIENT)
Dept: BARIATRICS/WEIGHT MGMT | Facility: CLINIC | Age: 64
End: 2024-04-29
Payer: COMMERCIAL

## 2024-04-29 VITALS
HEIGHT: 63 IN | BODY MASS INDEX: 46.78 KG/M2 | TEMPERATURE: 98.1 F | DIASTOLIC BLOOD PRESSURE: 90 MMHG | SYSTOLIC BLOOD PRESSURE: 140 MMHG | HEART RATE: 80 BPM | WEIGHT: 264 LBS

## 2024-04-29 DIAGNOSIS — E66.01 MORBID OBESITY WITH BMI OF 45.0-49.9, ADULT: Primary | ICD-10-CM

## 2024-04-29 DIAGNOSIS — Z71.3 DIETARY COUNSELING: ICD-10-CM

## 2024-04-29 DIAGNOSIS — E11.9 TYPE 2 DIABETES MELLITUS WITHOUT COMPLICATION, WITHOUT LONG-TERM CURRENT USE OF INSULIN: ICD-10-CM

## 2024-04-29 DIAGNOSIS — Z98.84 H/O LAPAROSCOPIC ADJUSTABLE GASTRIC BANDING: ICD-10-CM

## 2024-04-29 PROCEDURE — 99213 OFFICE O/P EST LOW 20 MIN: CPT | Performed by: NURSE PRACTITIONER

## 2024-04-29 RX ORDER — SEMAGLUTIDE 1.34 MG/ML
1 INJECTION, SOLUTION SUBCUTANEOUS WEEKLY
Qty: 9 ML | Refills: 1 | Status: SHIPPED | OUTPATIENT
Start: 2024-04-29

## 2024-04-29 NOTE — PROGRESS NOTES
[facility] BARIATR[facility] BARIATRIC SURGERY  [address]  [place] [id number] [phone]  [address]  [place] [address] [phone]  Dept: [phone]  4/29/2024      Mishel Jimenez.  [id number]  [id number]  1960  female      Chief Complaint   Patient presents with    Follow-up     Fup Ozempic       BH Post-Op Bariatric Surgery:   Mishel Jimenez is status post Laparoscopic Band procedure with APL, performed on 10/23/23.  Started on Ozempic on 1/30/2024 at 287#.      HPI:   Today's weight is 120 kg (264 lb) pounds, today's BMI is Body mass index is 46.78 kg/m²., has a  loss of 14 pounds since the last visit and weight loss since surgery is 44 pounds. The patient reports a decreased portion size and loss of appetite.      Mishel Jimenez denies nausea, vomiting, reflux, dysphagia and reports tolerating diet.  Feels like Ozempic has really helped her.  She also feels like like the band is at a good spot.  Ozempic helping with cravings, getting full.  Taking longer to eat.  Meals are about 20 minutes long.  Has helped with cravings especially with craving sweets.    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  Spitting out 90% of what she tastes because doesn't want to eat it.       Diet and Exercise: Diet history reviewed and discussed with the patient. Weight loss/gains to date discussed with the patient. The patient states they are eating 50-60 grams of protein per day. She reports eating 2- meals per day, a typical portion size of 1 cup, eating 1-2 snacks per day, drinking 5+ or more 8-oz. glasses of water per day, no carbonated beverage consumption and exercising regularly.     Supplements: bmtv.     Review of Systems   Constitutional:  Positive for activity change and appetite change.   Respiratory:  Negative for shortness of breath.    Cardiovascular:  Negative for chest pain.   All other systems reviewed and are negative.      Patient  Active Problem List   Diagnosis    Morbid obesity with BMI of 45.0-49.9, adult    Essential hypertension    Ankle edema    Intermittent claudication    Shortness of breath    Snoring    Chronic back pain    Chronic knee pain    Weakness    PAC (premature atrial contraction)    Hyperlipidemia    Dietary counseling    H/O laparoscopic adjustable gastric banding    DM2 (diabetes mellitus, type 2)       Past Medical History:   Diagnosis Date    At risk for sleep apnea     STOP BANG 5    Elevated hemoglobin A1c     HX    GERD (gastroesophageal reflux disease)     Hyperlipidemia 2010    Hypertension 2010    Shortness of breath on exertion        The following portions of the patient's history were reviewed and updated as appropriate: allergies, current medications, past medical history, past surgical history, and problem list.    Vitals:    04/29/24 1055   BP: 140/90   Pulse: 80   Temp: 98.1 °F (36.7 °C)       Physical Exam  Vitals reviewed.   Constitutional:       General: She is not in acute distress.     Appearance: Normal appearance. She is morbidly obese.   HENT:      Head: Normocephalic and atraumatic.      Mouth/Throat:      Mouth: Mucous membranes are moist.      Pharynx: Oropharynx is clear.   Eyes:      General: No scleral icterus.     Extraocular Movements: Extraocular movements intact.      Conjunctiva/sclera: Conjunctivae normal.      Pupils: Pupils are equal, round, and reactive to light.   Cardiovascular:      Rate and Rhythm: Normal rate and regular rhythm.   Pulmonary:      Effort: Pulmonary effort is normal. No respiratory distress.   Abdominal:      General: Bowel sounds are normal.      Palpations: Abdomen is soft.   Musculoskeletal:         General: Normal range of motion.      Cervical back: Normal range of motion and neck supple.   Skin:     General: Skin is warm and dry.   Neurological:      General: No focal deficit present.      Mental Status: She is alert and oriented to person, place, and time.    Psychiatric:         Mood and Affect: Mood normal.         Behavior: Behavior normal.         Thought Content: Thought content normal.         Judgment: Judgment normal.         Assessment:   Post-op, the patient is doing well.     Encounter Diagnoses   Name Primary?    Morbid obesity with BMI of 45.0-49.9, adult Yes    H/O laparoscopic adjustable gastric banding     Type 2 diabetes mellitus without complication, without long-term current use of insulin     Dietary counseling        Plan:   Will send in Ozempic 6 month supply  Encouraged patient to be sure to get plenty of lean protein per day through small frequent meals all with a protein source.   Activity restrictions: none.   Recommended patient be sure to get at least 70 grams of protein per day by eating small, frequent meals all with high lean protein choices. Be sure to limit/cut back on daily carbohydrate intake. Discussed with the patient the recommended amount of water per day to intake- half of body weight in ounces. Reviewed vitamin requirements. Be sure to do routine exercise, 150 minutes per week minimum, including both cardio and strength training.     Instructions / Recommendations: dietary counseling recommended, recommended a daily protein intake of  grams, vitamin supplement(s) recommended, recommended exercising at least 150 minutes per week, behavior modifications recommended and instructed to call the office for concerns, questions, or problems.     The patient was instructed to follow up in 3-6 months.     Total time spent during this encounter today was 25 minutes

## 2024-10-04 ENCOUNTER — OFFICE VISIT (OUTPATIENT)
Dept: BARIATRICS/WEIGHT MGMT | Facility: CLINIC | Age: 64
End: 2024-10-04
Payer: COMMERCIAL

## 2024-10-04 VITALS
TEMPERATURE: 98.4 F | BODY MASS INDEX: 45.71 KG/M2 | DIASTOLIC BLOOD PRESSURE: 90 MMHG | HEIGHT: 63 IN | WEIGHT: 258 LBS | HEART RATE: 80 BPM | SYSTOLIC BLOOD PRESSURE: 140 MMHG

## 2024-10-04 DIAGNOSIS — Z98.84 H/O LAPAROSCOPIC ADJUSTABLE GASTRIC BANDING: ICD-10-CM

## 2024-10-04 DIAGNOSIS — Z71.3 DIETARY COUNSELING: ICD-10-CM

## 2024-10-04 DIAGNOSIS — E11.9 TYPE 2 DIABETES MELLITUS WITHOUT COMPLICATION, WITHOUT LONG-TERM CURRENT USE OF INSULIN: ICD-10-CM

## 2024-10-04 DIAGNOSIS — E66.01 MORBID OBESITY WITH BMI OF 45.0-49.9, ADULT: Primary | ICD-10-CM

## 2024-10-04 PROBLEM — M54.16 LUMBAR RADICULOPATHY: Status: ACTIVE | Noted: 2024-03-01

## 2024-10-04 PROBLEM — M70.62 TROCHANTERIC BURSITIS OF LEFT HIP: Status: ACTIVE | Noted: 2024-04-30

## 2024-10-04 RX ORDER — POTASSIUM CHLORIDE 1500 MG/1
1 TABLET, EXTENDED RELEASE ORAL DAILY
COMMUNITY

## 2024-10-04 RX ORDER — METHOCARBAMOL 500 MG/1
1 TABLET, FILM COATED ORAL 3 TIMES DAILY PRN
COMMUNITY

## 2024-10-04 RX ORDER — SEMAGLUTIDE 2.68 MG/ML
2 INJECTION, SOLUTION SUBCUTANEOUS WEEKLY
Qty: 9 ML | Refills: 1 | Status: SHIPPED | OUTPATIENT
Start: 2024-10-04 | End: 2025-04-02

## 2024-10-04 NOTE — PROGRESS NOTES
[facility] BARIATRIC [facility] BARIATRIC SURGERY  [address]  [facility] [id number] [phone]  [address]  [facility] [id number] [phone]  Dept: [phone]  10/4/2024      Mishel Jimenez.  [id number]  [id number]  1960  female      Chief Complaint   Patient presents with    Follow-up     Fup band       BH Post-Op Bariatric Surgery:   Mishel Jimenez is status post Lapband procedure APL, performed on 10/23/2023 with 6.5 cc. Started on Ozempic 1 mg on 1/30/2024 for treatment of DM2 @ 287#.      HPI:   Today's weight is 117 kg (258 lb)  pounds, today's BMI is Body mass index is 45.71 kg/m². andHE@ has a loss of 6 pounds since the last visit. The patient reports a portion size larger than the small plate, increased hunger and denies a loss of appetite.     Mishel Jimenez denies nausea, dysphagia, or abdominal pain. The patientdoes not have vomitng. The patientdoes not have reflux.  Went on vacation and gained 6#.  Feels like the restriction she did have she is no longer experiencing.  She was having to take very small bites of meat and chew really well but now feels like she can take larger bites and not chew as well.  Having cravings again for high carb foods like pasta.     Diet and Exercise: Diet history reviewed and discussed with the patient. Weight loss/gains to date discussed with the patient. She reports eating 3 meals per day, a typical portion size of greater than 1 cup, eating 3 snack per day, drinking 5 8-oz. glasses of water per day. The patient can tolerate solid protein.   The patient is eating protein first. The patient is limiting food volume. The patient is taking vitamins. The patient is limiting snacking. The patient is exercising regularly. She is not drinking carbonated beverages.     The following portions of the patient's history were reviewed and updated as appropriate: allergies, current medications, past medical history, past  surgical history, and problem list.    Vitals:    10/04/24 0927   BP: 140/90   Pulse: 80   Temp: 98.4 °F (36.9 °C)       Review of Systems   Constitutional:  Positive for appetite change.   Respiratory:  Negative for shortness of breath.    Cardiovascular:  Negative for chest pain.   All other systems reviewed and are negative.      Physical Exam  Vitals reviewed.   Constitutional:       General: She is not in acute distress.     Appearance: Normal appearance. She is morbidly obese.   HENT:      Head: Normocephalic and atraumatic.      Mouth/Throat:      Mouth: Mucous membranes are moist.      Pharynx: Oropharynx is clear.   Eyes:      General: No scleral icterus.     Extraocular Movements: Extraocular movements intact.      Conjunctiva/sclera: Conjunctivae normal.      Pupils: Pupils are equal, round, and reactive to light.   Cardiovascular:      Rate and Rhythm: Normal rate and regular rhythm.   Pulmonary:      Effort: Pulmonary effort is normal. No respiratory distress.   Abdominal:      General: Bowel sounds are normal.      Palpations: Abdomen is soft.   Musculoskeletal:         General: Normal range of motion.      Cervical back: Normal range of motion and neck supple.   Skin:     General: Skin is warm and dry.   Neurological:      General: No focal deficit present.      Mental Status: She is alert and oriented to person, place, and time.   Psychiatric:         Mood and Affect: Mood normal.         Behavior: Behavior normal.         Thought Content: Thought content normal.         Judgment: Judgment normal.         Assessment: Post-operatively the patient is doing well    Encounter Diagnoses   Name Primary?    Morbid obesity with BMI of 45.0-49.9, adult Yes    H/O laparoscopic adjustable gastric banding     Type 2 diabetes mellitus without complication, without long-term current use of insulin     Dietary counseling        Plan:     I think she would benefit from additional band restriction.  Under aseptic  conditions, I accessed the port and 1 mls of fluid were added for a total of 7.5mls.  She was able to tolerate a glass of water at the conclusion of the fill.  She was advised to consume soft solids for 24 hours before advancing back to a regular diet.  RTC for n/v/d/regurg.     Will increase dose of Ozempic to 2 mg weekly.  [facility]     We discussed her protein sources and that eating dense solid protein along with plenty of vegetables and fresh fruits is important for weight loss. Reviewed appropriate water intake- half of body weight in ounces and exercise routine- minimum of 150 minutes per with including both cardio and strength training. Instructed not to drink with meals and wait 45 minutes after each meal before drinking.    She should follow-up in 6 months or prn.       Activity restrictions: None.   Instructions / Recommendations: dietary counseling recommended, recommended a daily protein intake of  grams, patient was advised that the lap band system works best when consuming solid foods, vitamin supplement(s) recommended, recommended exercising at least 150 minutes per week, behavior modifications recommended and instructed to call the office for concerns, questions, or problems.    Total time spent with the patient was approximately 25 minutes.  Chart was also reviewed prior to starting the visit. The patient was counseled regarding the LAP-BAND and how the band works.

## 2025-04-04 ENCOUNTER — OFFICE VISIT (OUTPATIENT)
Dept: BARIATRICS/WEIGHT MGMT | Facility: CLINIC | Age: 65
End: 2025-04-04
Payer: COMMERCIAL

## 2025-04-04 VITALS
SYSTOLIC BLOOD PRESSURE: 142 MMHG | TEMPERATURE: 97.4 F | BODY MASS INDEX: 44.12 KG/M2 | HEIGHT: 63 IN | WEIGHT: 249 LBS | DIASTOLIC BLOOD PRESSURE: 90 MMHG | HEART RATE: 86 BPM

## 2025-04-04 DIAGNOSIS — E11.9 TYPE 2 DIABETES MELLITUS WITHOUT COMPLICATION, WITHOUT LONG-TERM CURRENT USE OF INSULIN: ICD-10-CM

## 2025-04-04 DIAGNOSIS — Z98.84 H/O LAPAROSCOPIC ADJUSTABLE GASTRIC BANDING: ICD-10-CM

## 2025-04-04 DIAGNOSIS — Z71.3 DIETARY COUNSELING: ICD-10-CM

## 2025-04-04 DIAGNOSIS — E66.01 MORBID OBESITY WITH BODY MASS INDEX (BMI) OF 40.0 TO 44.9 IN ADULT: Primary | ICD-10-CM

## 2025-04-04 RX ORDER — EVOLOCUMAB 140 MG/ML
140 INJECTION, SOLUTION SUBCUTANEOUS
COMMUNITY
Start: 2025-03-24

## 2025-04-04 RX ORDER — SEMAGLUTIDE 2.68 MG/ML
2 INJECTION, SOLUTION SUBCUTANEOUS WEEKLY
Qty: 9 ML | Refills: 2 | Status: SHIPPED | OUTPATIENT
Start: 2025-04-04

## 2025-04-04 NOTE — PROGRESS NOTES
[facility] BARIATRIC [facility] BARIATRIC SURGERY  [address]  [facility] [id number] [phone]  [address]  [facility] [id number] [phone]  Dept: [phone]  4/4/2025      Mishel Jimenez.  [id number]  [id number]  1960  female      Chief Complaint   Patient presents with    Follow-up     Fup ozempic       BH Post-Op Bariatric Surgery:   Mishel Jimenez is status post Lapband procedure APL, performed on 10/23/2023.     HPI:   Today's weight is 113 kg (249 lb)  pounds, today's BMI is Body mass index is 44.12 kg/m². andHE@ has a loss of 9 pounds since the last visit. The patient reports a portion size larger than the small plate, increased hunger and denies a loss of appetite.     Mishel Jimenez denies nausea, dysphagia, or abdominal pain. The patientdoes not have vomitng. The patientdoes not have reflux.    Tries to carry healthy snacks.  Travels a lot.  Sometimes doesn't have enough food so has to eat whatever is at the hotel.  Has noticed has been a little more hungry lately.  Struggles at times with certain meats.  Can tolerate pulled pork rather than pork chop.  Feeling like is getting hungry sooner after meals.      Diet and Exercise: Diet history reviewed and discussed with the patient. Weight loss/gains to date discussed with the patient. She reports eating 2-3 meals per day, a typical portion size of greater than 1/2-1 cup, eating 1-2 snack per day, drinking 5 8-oz. glasses of water per day. The patient can tolerate solid protein.   The patient is eating protein first. The patient is limiting food volume. The patient is taking vitamins. The patient is limiting snacking. The patient is exercising regularly. She is not drinking carbonated beverages.     The following portions of the patient's history were reviewed and updated as appropriate: allergies, current medications, past medical history, past surgical history, and problem list.    Vitals:     04/04/25 0944   BP: 142/90   Pulse: 86   Temp: 97.4 °F (36.3 °C)       Review of Systems   Constitutional:  Positive for activity change and appetite change.   Respiratory:  Negative for shortness of breath.    Cardiovascular:  Negative for chest pain and palpitations.   All other systems reviewed and are negative.      Physical Exam  Vitals reviewed.   Constitutional:       General: She is not in acute distress.     Appearance: Normal appearance. She is morbidly obese.   HENT:      Head: Normocephalic and atraumatic.      Mouth/Throat:      Mouth: Mucous membranes are moist.      Pharynx: Oropharynx is clear.   Eyes:      General: No scleral icterus.     Extraocular Movements: Extraocular movements intact.      Conjunctiva/sclera: Conjunctivae normal.      Pupils: Pupils are equal, round, and reactive to light.   Cardiovascular:      Rate and Rhythm: Normal rate and regular rhythm.   Pulmonary:      Effort: Pulmonary effort is normal. No respiratory distress.   Abdominal:      General: Bowel sounds are normal.      Palpations: Abdomen is soft.   Musculoskeletal:         General: Normal range of motion.      Cervical back: Normal range of motion and neck supple.   Skin:     General: Skin is warm and dry.   Neurological:      General: No focal deficit present.      Mental Status: She is alert and oriented to person, place, and time.   Psychiatric:         Mood and Affect: Mood normal.         Behavior: Behavior normal.         Thought Content: Thought content normal.         Judgment: Judgment normal.         Assessment: Post-operatively the patient is doing well    Encounter Diagnoses   Name Primary?    Morbid obesity with body mass index (BMI) of 40.0 to 44.9 in adult Yes    H/O laparoscopic adjustable gastric banding     Type 2 diabetes mellitus without complication, without long-term current use of insulin     Dietary counseling        Plan:     I think she would benefit from additional band restriction.   Under aseptic conditions, I accessed the port and 0.75mls of fluid were added for a total of 8.25mls.  She was able to tolerate a glass of water at the conclusion of the fill.  She was advised to consume soft solids for 24 hours before advancing back to a regular diet.  RTC for n/v/d/regurg.     We discussed her protein sources and that eating dense solid protein along with plenty of vegetables and fresh fruits is important for weight loss. Reviewed appropriate water intake- half of body weight in ounces and exercise routine- minimum of 150 minutes per with including both cardio and strength training. Instructed not to drink with meals and wait 45 minutes after each meal before drinking.    She should follow-up in 6 months or sooner if feels like band needs to be adjusted.  Will refill Ozempic 2 mg weekly for treatment of her DM2     Activity restrictions: None.   Instructions / Recommendations: dietary counseling recommended, recommended a daily protein intake of  grams, patient was advised that the lap band system works best when consuming solid foods, vitamin supplement(s) recommended, recommended exercising at least 150 minutes per week, behavior modifications recommended and instructed to call the office for concerns, questions, or problems.    Total time spent with the patient was approximately 25 minutes.  Chart was also reviewed prior to starting the visit. The patient was counseled regarding the LAP-BAND and how the band works.

## (undated) DEVICE — NDL HYPO PRECISIONGLIDE REG 21G 1 1/2

## (undated) DEVICE — SUT SILK 2/0 SH 30IN K833H

## (undated) DEVICE — SUT VIC 2/0 SH 27IN

## (undated) DEVICE — LAPAROVUE VISIBILITY SYSTEM LAPAROSCOPIC SOLUTIONS: Brand: LAPAROVUE

## (undated) DEVICE — GLV SURG SENSICARE PI MIC PF SZ8.5 LF STRL

## (undated) DEVICE — TROC BLADLES ANCHORPORT/OPTI LP 5X120MM 1P/U

## (undated) DEVICE — ENDOPATH XCEL UNIVERSAL TROCAR STABLILITY SLEEVES: Brand: ENDOPATH XCEL

## (undated) DEVICE — GLV SURG SENSICARE PI PF LF 8.5 GRN STRL

## (undated) DEVICE — ST TBG AIRSEAL FLTR TRI LUM

## (undated) DEVICE — LAP-BAND® SYSTEM CALIBRATION TUBE: Brand: LAP-BAND® SYSTEM CALIBRATION TUBE

## (undated) DEVICE — DISPOSABLE MONOPOLAR ENDOSCOPIC CORD 10 FT. (3M): Brand: KIRWAN

## (undated) DEVICE — ADHS SKIN SURG TISS VISC PREMIERPRO EXOFIN HI/VISC FAST/DRY

## (undated) DEVICE — ENDOPATH XCEL BLADELESS TROCARS WITH STABILITY SLEEVES: Brand: ENDOPATH XCEL

## (undated) DEVICE — LAPAROSCOPIC SMOKE FILTRATION SYSTEM: Brand: PALL LAPAROSHIELD® PLUS LAPAROSCOPIC SMOKE FILTRATION SYSTEM

## (undated) DEVICE — PK BARIATRIC 10 40 70

## (undated) DEVICE — DECANTER BAG 9": Brand: MEDLINE INDUSTRIES, INC.

## (undated) DEVICE — SUT MNCRYL 4/0 PS2 18 IN

## (undated) DEVICE — TROCAR: Brand: KII OPTICAL ACCESS SYSTEM

## (undated) DEVICE — CONTAINER,SPECIMEN,OR STERILE,4OZ: Brand: MEDLINE

## (undated) DEVICE — MINI ENDOCUT SCISSOR TIP, DISPOSABLE: Brand: RENEW

## (undated) DEVICE — 2, DISPOSABLE SUCTION/IRRIGATOR WITH DISPOSABLE TIP: Brand: STRYKEFLOW

## (undated) DEVICE — DEV COND GAS LAP INSUFLOW W/LUER CONN

## (undated) DEVICE — PATIENT RETURN ELECTRODE, SINGLE-USE, CONTACT QUALITY MONITORING, ADULT, WITH 9FT CORD, FOR PATIENTS WEIGING OVER 33LBS. (15KG): Brand: MEGADYNE

## (undated) DEVICE — SUT ETHIB 0 MO7 18IN CX41D